# Patient Record
Sex: MALE | Race: BLACK OR AFRICAN AMERICAN | NOT HISPANIC OR LATINO | Employment: UNEMPLOYED | URBAN - METROPOLITAN AREA
[De-identification: names, ages, dates, MRNs, and addresses within clinical notes are randomized per-mention and may not be internally consistent; named-entity substitution may affect disease eponyms.]

---

## 2023-08-22 LAB
CALCIDIOL (25 OH VITAMIN D3) (NG/ML) IN SER/PLAS: 27 NG/ML
ESTIMATED AVERAGE GLUCOSE FOR HBA1C: 117 MG/DL
HEMOGLOBIN A1C/HEMOGLOBIN TOTAL IN BLOOD: 5.7 %
THYROTROPIN (MIU/L) IN SER/PLAS BY DETECTION LIMIT <= 0.05 MIU/L: 1.54 MIU/L (ref 0.44–3.98)

## 2023-09-27 PROBLEM — H10.13 ALLERGIC CONJUNCTIVITIS OF BOTH EYES: Status: ACTIVE | Noted: 2023-09-27

## 2023-09-27 PROBLEM — R11.0 NAUSEA IN ADULT: Status: ACTIVE | Noted: 2023-09-27

## 2023-09-27 PROBLEM — R03.0 WHITE COAT SYNDROME WITHOUT HYPERTENSION: Status: ACTIVE | Noted: 2023-09-27

## 2023-09-27 PROBLEM — R03.0 ELEVATED BLOOD PRESSURE READING: Status: ACTIVE | Noted: 2023-09-27

## 2023-09-27 PROBLEM — J45.50 SEVERE PERSISTENT ASTHMA, UNCOMPLICATED (MULTI): Status: ACTIVE | Noted: 2023-09-27

## 2023-09-27 PROBLEM — J45.40 MODERATE PERSISTENT ASTHMA WITHOUT COMPLICATION (HHS-HCC): Status: ACTIVE | Noted: 2023-09-27

## 2023-09-27 PROBLEM — K64.4 EXTERNAL HEMORRHOID: Status: ACTIVE | Noted: 2023-09-27

## 2023-09-27 PROBLEM — R10.9 ABDOMINAL PAIN: Status: ACTIVE | Noted: 2023-09-27

## 2023-09-27 PROBLEM — R10.30 LOWER ABDOMINAL PAIN: Status: ACTIVE | Noted: 2023-09-27

## 2023-09-27 PROBLEM — F32.A ANXIETY AND DEPRESSION: Status: ACTIVE | Noted: 2023-09-27

## 2023-09-27 PROBLEM — L70.9 ACNE: Status: ACTIVE | Noted: 2022-11-04

## 2023-09-27 PROBLEM — K59.00 CONSTIPATION: Status: ACTIVE | Noted: 2023-09-27

## 2023-09-27 PROBLEM — F39 UNSPECIFIED MOOD (AFFECTIVE) DISORDER (CMS-HCC): Status: ACTIVE | Noted: 2023-09-27

## 2023-09-27 PROBLEM — R76.8 ELEVATED IGE LEVEL: Status: ACTIVE | Noted: 2023-09-27

## 2023-09-27 PROBLEM — J45.909 ASTHMA (HHS-HCC): Status: ACTIVE | Noted: 2023-09-27

## 2023-09-27 PROBLEM — R09.89 CHRONIC THROAT CLEARING: Status: ACTIVE | Noted: 2023-09-27

## 2023-09-27 PROBLEM — E55.9 VITAMIN D DEFICIENCY: Status: ACTIVE | Noted: 2023-09-27

## 2023-09-27 PROBLEM — H02.849 EYELID EDEMA: Status: ACTIVE | Noted: 2023-09-27

## 2023-09-27 PROBLEM — N53.19 ABNORMAL EJACULATION: Status: ACTIVE | Noted: 2023-09-27

## 2023-09-27 PROBLEM — E66.9 OBESITY PEDS (BMI >=95 PERCENTILE): Status: ACTIVE | Noted: 2023-09-27

## 2023-09-27 PROBLEM — K21.9 GERD (GASTROESOPHAGEAL REFLUX DISEASE): Status: ACTIVE | Noted: 2023-09-27

## 2023-09-27 PROBLEM — F54 PSYCHOLOGICAL FACTOR AFFECTING PHYSICAL CONDITION: Status: ACTIVE | Noted: 2023-09-27

## 2023-09-27 PROBLEM — F41.9 ANXIETY AND DEPRESSION: Status: ACTIVE | Noted: 2023-09-27

## 2023-09-27 PROBLEM — L90.6 STRIAE: Status: ACTIVE | Noted: 2023-09-27

## 2023-09-27 PROBLEM — L30.9 ECZEMA: Status: ACTIVE | Noted: 2023-09-27

## 2023-09-27 PROBLEM — R73.03 PREDIABETES: Status: ACTIVE | Noted: 2023-09-27

## 2023-09-27 PROBLEM — R06.02 SHORTNESS OF BREATH ON EXERTION: Status: ACTIVE | Noted: 2023-09-27

## 2023-09-27 PROBLEM — J38.3 VOCAL CORD DYSFUNCTION: Status: ACTIVE | Noted: 2023-09-27

## 2023-09-27 PROBLEM — R07.9 CHEST PAIN: Status: ACTIVE | Noted: 2023-09-27

## 2023-09-27 PROBLEM — R68.89 DISTORTED BODY IMAGE: Status: ACTIVE | Noted: 2023-09-27

## 2023-09-27 PROBLEM — E66.3 OVERWEIGHT (BMI 25.0-29.9): Status: ACTIVE | Noted: 2023-09-27

## 2023-09-27 PROBLEM — K92.1 HEMATOCHEZIA: Status: ACTIVE | Noted: 2023-09-27

## 2023-09-27 PROBLEM — K29.70 GASTRITIS: Status: ACTIVE | Noted: 2023-09-27

## 2023-09-27 PROBLEM — Z51.6 DESENSITIZATION TO ALLERGY SHOT: Status: ACTIVE | Noted: 2023-09-27

## 2023-09-27 PROBLEM — R94.2 ABNORMAL FLOW VOLUME LOOP: Status: ACTIVE | Noted: 2023-09-27

## 2023-09-27 PROBLEM — J30.9 ALLERGIC RHINITIS: Status: ACTIVE | Noted: 2023-09-27

## 2023-09-27 RX ORDER — LANSOPRAZOLE 30 MG/1
30 CAPSULE, DELAYED RELEASE ORAL
COMMUNITY

## 2023-09-27 RX ORDER — PSYLLIUM HUSK 0.4 G
1 CAPSULE ORAL 2 TIMES DAILY
COMMUNITY
Start: 2022-08-25

## 2023-09-27 RX ORDER — POLYETHYLENE GLYCOL 3350 17 G/17G
17 POWDER, FOR SOLUTION ORAL
COMMUNITY
Start: 2018-10-08

## 2023-09-27 RX ORDER — OMEPRAZOLE 20 MG/1
2 CAPSULE, DELAYED RELEASE ORAL
COMMUNITY
Start: 2022-02-21

## 2023-09-27 RX ORDER — POLYETHYLENE GLYCOL 3350 17 G/17G
17 POWDER, FOR SOLUTION ORAL DAILY
COMMUNITY

## 2023-09-27 RX ORDER — CLINDAMYCIN PHOSPHATE 10 UG/ML
LOTION TOPICAL
COMMUNITY
Start: 2022-05-04

## 2023-09-27 RX ORDER — ALBUTEROL SULFATE 90 UG/1
2-4 AEROSOL, METERED RESPIRATORY (INHALATION) SEE ADMIN INSTRUCTIONS
COMMUNITY

## 2023-09-27 RX ORDER — PNV NO.95/FERROUS FUM/FOLIC AC 28MG-0.8MG
100 TABLET ORAL DAILY
COMMUNITY

## 2023-09-27 RX ORDER — ONDANSETRON 4 MG/1
1-2 TABLET, ORALLY DISINTEGRATING ORAL EVERY 8 HOURS PRN
COMMUNITY
Start: 2022-02-21

## 2023-09-27 RX ORDER — CETIRIZINE HYDROCHLORIDE 10 MG/1
1 TABLET ORAL DAILY
COMMUNITY
Start: 2014-07-22 | End: 2024-04-03 | Stop reason: SDUPTHER

## 2023-09-27 RX ORDER — FLUTICASONE PROPIONATE 50 MCG
2 SPRAY, SUSPENSION (ML) NASAL DAILY
COMMUNITY
Start: 2022-05-04 | End: 2024-04-03 | Stop reason: SDUPTHER

## 2023-09-27 RX ORDER — POLYETHYLENE GLYCOL 3350 17 G/17G
17 POWDER, FOR SOLUTION ORAL 2 TIMES DAILY PRN
COMMUNITY

## 2023-09-27 RX ORDER — ALBUTEROL SULFATE 90 UG/1
2 AEROSOL, METERED RESPIRATORY (INHALATION) EVERY 4 HOURS PRN
COMMUNITY
Start: 2019-01-17

## 2023-09-27 RX ORDER — BISACODYL 5 MG
5 TABLET, DELAYED RELEASE (ENTERIC COATED) ORAL SEE ADMIN INSTRUCTIONS
COMMUNITY
Start: 2018-10-08

## 2023-09-27 RX ORDER — OLOPATADINE HYDROCHLORIDE 2 MG/ML
1 SOLUTION/ DROPS OPHTHALMIC DAILY
COMMUNITY

## 2023-09-27 RX ORDER — OMEPRAZOLE 10 MG/1
2 CAPSULE, DELAYED RELEASE ORAL DAILY
COMMUNITY

## 2023-09-27 RX ORDER — AZELASTINE 1 MG/ML
2 SPRAY, METERED NASAL 2 TIMES DAILY
COMMUNITY
Start: 2018-09-04

## 2023-09-27 RX ORDER — IPRATROPIUM BROMIDE 42 UG/1
2 SPRAY, METERED NASAL 2 TIMES DAILY
COMMUNITY
Start: 2022-05-04 | End: 2023-10-16 | Stop reason: SDUPTHER

## 2023-09-27 RX ORDER — AZELASTINE HYDROCHLORIDE 0.5 MG/ML
1 SOLUTION/ DROPS OPHTHALMIC 2 TIMES DAILY PRN
COMMUNITY
Start: 2017-10-17 | End: 2024-04-03 | Stop reason: ALTCHOICE

## 2023-09-27 RX ORDER — POLYETHYLENE GLYCOL 3350, SODIUM SULFATE ANHYDROUS, SODIUM BICARBONATE, SODIUM CHLORIDE, POTASSIUM CHLORIDE 236; 22.74; 6.74; 5.86; 2.97 G/4L; G/4L; G/4L; G/4L; G/4L
POWDER, FOR SOLUTION ORAL
COMMUNITY
Start: 2022-08-25

## 2023-09-27 RX ORDER — ALBUTEROL SULFATE 0.83 MG/ML
2.5 SOLUTION RESPIRATORY (INHALATION)
COMMUNITY
Start: 2018-10-02

## 2023-09-27 RX ORDER — MOMETASONE FUROATE AND FORMOTEROL FUMARATE DIHYDRATE 200; 5 UG/1; UG/1
2 AEROSOL RESPIRATORY (INHALATION)
COMMUNITY
Start: 2015-03-18

## 2023-09-27 RX ORDER — ALBUTEROL SULFATE 90 UG/1
1-2 AEROSOL, METERED RESPIRATORY (INHALATION) SEE ADMIN INSTRUCTIONS
COMMUNITY

## 2023-10-03 ENCOUNTER — EVALUATION (OUTPATIENT)
Dept: SPEECH THERAPY | Facility: CLINIC | Age: 23
End: 2023-10-03
Payer: COMMERCIAL

## 2023-10-03 ENCOUNTER — OFFICE VISIT (OUTPATIENT)
Dept: OTOLARYNGOLOGY | Facility: CLINIC | Age: 23
End: 2023-10-03
Payer: COMMERCIAL

## 2023-10-03 ENCOUNTER — APPOINTMENT (OUTPATIENT)
Dept: SPEECH THERAPY | Facility: CLINIC | Age: 23
End: 2023-10-03
Payer: COMMERCIAL

## 2023-10-03 VITALS — WEIGHT: 231 LBS | HEIGHT: 71 IN | BODY MASS INDEX: 32.34 KG/M2 | TEMPERATURE: 97.4 F

## 2023-10-03 DIAGNOSIS — J38.3 VOCAL CORD DYSFUNCTION: Primary | ICD-10-CM

## 2023-10-03 DIAGNOSIS — K21.9 GASTROESOPHAGEAL REFLUX DISEASE, UNSPECIFIED WHETHER ESOPHAGITIS PRESENT: ICD-10-CM

## 2023-10-03 DIAGNOSIS — R09.89 CHRONIC THROAT CLEARING: ICD-10-CM

## 2023-10-03 PROCEDURE — 31575 DIAGNOSTIC LARYNGOSCOPY: CPT | Performed by: OTOLARYNGOLOGY

## 2023-10-03 PROCEDURE — 92524 BEHAVRAL QUALIT ANALYS VOICE: CPT | Mod: GN

## 2023-10-03 PROCEDURE — 1036F TOBACCO NON-USER: CPT | Performed by: OTOLARYNGOLOGY

## 2023-10-03 PROCEDURE — 99244 OFF/OP CNSLTJ NEW/EST MOD 40: CPT | Performed by: OTOLARYNGOLOGY

## 2023-10-03 ASSESSMENT — ENCOUNTER SYMPTOMS
LOSS OF SENSATION IN FEET: 0
DEPRESSION: 0
OCCASIONAL FEELINGS OF UNSTEADINESS: 0

## 2023-10-03 ASSESSMENT — PATIENT HEALTH QUESTIONNAIRE - PHQ9
SUM OF ALL RESPONSES TO PHQ9 QUESTIONS 1 AND 2: 0
2. FEELING DOWN, DEPRESSED OR HOPELESS: NOT AT ALL
1. LITTLE INTEREST OR PLEASURE IN DOING THINGS: NOT AT ALL

## 2023-10-03 NOTE — Clinical Note
October 3, 2023     Patient: Patricia Calhoun   YOB: 2000   Date of Visit: 10/3/2023       To Whom it May Concern:    Patricia Calhoun was seen in my clinic on 10/3/2023. He {Return to school/sport:94237}.    If you have any questions or concerns, please don't hesitate to call.         Sincerely,          Jo Núñez, SLP        CC: No Recipients

## 2023-10-03 NOTE — PROGRESS NOTES
Speech-Language Pathology    Voice/TEP Evaluation    Patient Name: Patricia Calhoun  MRN: [id number]  Today's Date: 10/3/2023     Time Calculation  Start Time: 1130  Stop Time: 1200  Time Calculation (min): 30 min      Current Problem:  Patient Active Problem List   Diagnosis    Acne    Allergic conjunctivitis of both eyes    Allergic rhinitis    Anxiety and depression    Unspecified mood (affective) disorder (CMS/HCC)    Asthma    Abdominal pain    Chest pain    Lower abdominal pain    Abnormal flow volume loop    Chronic throat clearing    Distorted body image    Eczema    Elevated blood pressure reading    Elevated IgE level    External hemorrhoid    Eyelid edema    Gastritis    GERD (gastroesophageal reflux disease)    Hematochezia    Moderate persistent asthma without complication    Severe persistent asthma, uncomplicated    Constipation    Nausea in adult    Overweight (BMI 25.0-29.9)    Prediabetes    Psychological factor affecting physical condition    Shortness of breath on exertion    Striae    Vitamin D deficiency    Vocal cord dysfunction    White coat syndrome without hypertension    Abnormal ejaculation    Desensitization to allergy shot    Obesity peds (BMI >=95 percentile)   IMPRESSIONS  Patient presents with vocal cord dysfunction. Exacerbating factors include chronic throat clearing, untreated reflux and anxiety. Patient appears to be an excellent candidate for therapy which will target vocal wellness and respiratory retrianing.    Voice quality based on the GRBAS scale: 0=absent; 1=mild; 2=moderate; 3=severe    ndGndrndanddndend:nd nd2nd Roughness: 1    Breathiness: 0    Asthenia: 0    Strain: 0    CONTRIBUTING FACTORS  Supraglottic compression/MTD  Inadequate breath support/VCD/ILS  Habitual behaviors that misuse/abuse the voice  Exposure to biological irritants (GERD/LPR)  Inadequate intake of fluids for hydration    Pain = 0    SUBJECTIVE  HISTORY/REASON FOR REFERRAL    ASSESSMENT  PERCEPTUAL VOICE  FEATURES    Intonation: WFL  Loudness: WFL  Nasal resonance: WFL    Additional vocal characteristics noted included:  Glottal mohamud  Throat clears/coughing    FLEXIBLE LARYNGOSCOPY  Glottic closure: complete  Vocal fold mobility: normal  Mucosal edge: smooth bilaterally  Vascularity: posterior bilateral vocal cord edema and erythema  Subglottis: widely patent  Interarytenoid edema: none  Deep breaths: mild splinting with deep breaths    ADDITIONAL OBSERVATIONS  Improved breathing with straw breathing and RTB    TREATMENT  Initiated instruction this date in cough/throat clear reduction techniques and respiratory retraining including traditional RTB and straw breathing techniques.     Clinician modeled all techniques and accurate patient follow through confirmed.  Handouts provided to facilitate optimal home carryover.    PLAN OF CARE  Frequency: 2-4 times a month  Duration: 1-2 months    LONG TERM GOALS  Improve overall vocal and respiratory health to foster increased participation levels at home, work and in the community environment.    SHORT TERM GOALS  Patient will increase vocal wellness and decrease phono trauma in adherence with clinician prescribed vocal hygiene and wellness program per patient report 80% of his/her day.  Patient will demonstrate independent use of voice/breathing techniques x 80% accuracy.    RECOMMENDATIONS FOR THERAPEUTIC INTERVENTIONS  Respiratory training  Vocal hygiene program      POTENTIAL FOR IMPROVEMENT  Good      FACTORS AFFECTING PROGNOSIS  None    DISCUSSED PLAN OF CARE WITH the patient.  DISCUSSED RISK/BENEFITS WITH the patient.  PATIENT/CAREGIVER AGREEABLE WITH PLAN.

## 2023-10-03 NOTE — PROGRESS NOTES
Subjective   Patient ID: Patricia Calhoun is a 23 y.o. male who presents for vocal cord dysfunction.  Patricia Calhoun is a 23 y.o. male referred to me today by Dr. Keegan Petty  for vocal cord dysfunction.      He reports that he has throat tightness. This is worse with stress and exercise. He has associated postnasal drip in cold temperature exposure. The use of an inhaler improves resolves his symptoms after 3-4 minutes. He has wheezing with these episodes. He has noticed that he is not able to talk with these episodes. He denies chest tightness with these episodes. This doesn't wake him up at night.     He has tried Omeprazole without improvement in his reflux. He has symptoms twice a week. He has not modified his diet and has not tried other ppis.      He denies hoarseness or changes to swallow.      PMH: Allergic rhinitis, GERD, asthma,  FHx: reviewed and non-contributory to current complaint.   I personally reviewed the patient intake forms, and these were scanned into the electronic medical record today, 10/03/2032             Review of Systems  ROS performed. All other systems are reviewed and are negative for complaint      Objective   Physical Exam  CONSTITUTIONAL: Vitals - per intake. Well developed, well nourished.    VOICE: Normal   ORAL CAVITY/OROPHARYNX/LIPS: Normal mucous membranes, normal floor of mouth/tongue/OP, no masses or lesions are noted.  Tonsils are 1+  RESPIRATION: Breathing comfortably, no stridor.    PSYCH: Alert and oriented with appropriate mood and affect.       PROCEDURE NOTE:  Recommended flexible laryngoscopy. Risks, benefits,  and alternatives were explained. He wished to proceed and provides verbal consent.     PROCEDURE:  Flexible Laryngoscopy, CPT 68242    POSTPROCEDURE DIAGNOSIS: ? VCD    INDICATIONS: Inability to tolerate mirror exam or abnormal findings on mirror, Flexible Laryngoscopy/Stroboscopy performed to assess one of the followin. Diagnosis of symptomatic disorder  involving the voice, swallow, upper aerodigestive tract, including KENNEY disorders, or  2. Preoperative evaluation of vocal cord function for individuals undergoing surgery where the RLN or vagus nerves are at risk of injury, or  3. Further evaluation of abnormalities of the upper aerodigestive tract discovered by another modality, such as CT, MRI, bronchoscopy or EGD    Description of Procedure:    After adequate afrin and lidocaine spray, I advanced the endoscope.  Visualization of the nasopharynx, vallecula, posterior pharyngeal walls, pyriform, epiglottis and post cricoid areas was unremarkable.  The following laryngeal findings were noted:    vocal cord movement was normal   closure was complete   Edema and erythema of the posterior vocal cords   interarytenoid edema mild  lesions were normal   There was splinting of the vocal cords   the subglottis was widely patent  Pharyngeal wall squeeze was normal    Procedure well tolerated.     Assessment/Plan   This is an initial visit for ? VCD with clinical findings of splinting of the bilateral vocal cords consistent with VCD.    Secondary issues of bilateral posterior vocal cord edema and erythema likely due to throat clearing or cough.     Exacerbating factors include:  Asthma, uncontrolled GERD    Treatment options discussed including:     He will work with speech therapy on activity based breathing and respiratory retraining. At this point, he will hold off on strenuous exercises.   He is receiving therapy for anxiety. He will continue this.  He will work with his PCP on reflux management     The patient’s questions were answered.

## 2023-10-03 NOTE — PATIENT INSTRUCTIONS
You need speech therapy as part of your treatment. We will help you to schedule your speech appointment after your visit or you can call Speech Therapy Scheduling at [phone]. Please follow up pending the outcome of speech therapy.    Follow up with PCP on reflux management

## 2023-10-16 ENCOUNTER — OFFICE VISIT (OUTPATIENT)
Dept: ALLERGY | Facility: HOSPITAL | Age: 23
End: 2023-10-16
Payer: COMMERCIAL

## 2023-10-16 VITALS
SYSTOLIC BLOOD PRESSURE: 133 MMHG | HEIGHT: 72 IN | TEMPERATURE: 97.9 F | BODY MASS INDEX: 32.19 KG/M2 | DIASTOLIC BLOOD PRESSURE: 84 MMHG | HEART RATE: 108 BPM | WEIGHT: 237.66 LBS | RESPIRATION RATE: 20 BRPM

## 2023-10-16 DIAGNOSIS — J45.50 SEVERE PERSISTENT ASTHMA WITHOUT COMPLICATION (MULTI): Primary | ICD-10-CM

## 2023-10-16 DIAGNOSIS — J30.1 SEASONAL ALLERGIC RHINITIS DUE TO POLLEN: ICD-10-CM

## 2023-10-16 DIAGNOSIS — J31.0 MIXED RHINITIS: ICD-10-CM

## 2023-10-16 PROCEDURE — 1036F TOBACCO NON-USER: CPT | Performed by: ALLERGY & IMMUNOLOGY

## 2023-10-16 PROCEDURE — 3075F SYST BP GE 130 - 139MM HG: CPT | Performed by: ALLERGY & IMMUNOLOGY

## 2023-10-16 PROCEDURE — 3079F DIAST BP 80-89 MM HG: CPT | Performed by: ALLERGY & IMMUNOLOGY

## 2023-10-16 PROCEDURE — 99214 OFFICE O/P EST MOD 30 MIN: CPT | Performed by: ALLERGY & IMMUNOLOGY

## 2023-10-16 RX ORDER — IPRATROPIUM BROMIDE 42 UG/1
2 SPRAY, METERED NASAL 3 TIMES DAILY
Qty: 36 ML | Refills: 11 | Status: SHIPPED | OUTPATIENT
Start: 2023-10-16 | End: 2024-10-15

## 2023-10-16 RX ORDER — MONTELUKAST SODIUM 10 MG/1
10 TABLET ORAL DAILY
Qty: 30 TABLET | Refills: 5 | Status: SHIPPED | OUTPATIENT
Start: 2023-10-16 | End: 2024-04-03 | Stop reason: SDUPTHER

## 2023-10-16 NOTE — PROGRESS NOTES
Patricia Calhoun presents for follow up evaluation today.      He provides the following history:    Last plan  continue zyrtec  Continue flonase  restart ipratropium nasal spray 2 sprays each nostril 2 x daily as needed    INCREASE the Dulera to 2 puffs 2 x daily of the 200mcg  Dulera is also your rescue medication: 50mcg 2 puffs evrry 4-6 hour as needed  to maximum of 12 puffs per day of dulera  YOU DO NOT NEED ALBUTEROL OR THE NEBULIZER WITH THIS PLAN    not restarting Spiriva at this time, will consider restarting if above plan doesn’t work    follow up with GI about your gastritis    Schedule breathing test and FENO at main campus    referral to Speech therapy for VCD  Vocal cord dysfunction is caused by paradoxical vocal cord movement; meaning it causes the abnormal closure of the vocal cords with inhalation, so air has difficulty passing through the small opening and it causes shortness of breath.  I am suggesting Vocal Cord Exercises and  I am referring you to Speech therapy for more evaluation.      Interval:  ENT eval 2 weeks ago, confirmed VCD and recommended speech therapy, has done one session same day of visit.  FEV1 78%  FENO 48    Dulera rescue 1 x per week    rhinitis: takes flonase and zyrtec, and has ongoing congestion   asthma: no OCS or major flares since last visit      ROS:  Pertinent positives and negatives have been assessed in the HPI.  All others systems have been reviewed and are negative for complaint.      Vital signs:  Vitals:    10/16/23 1338   BP: 133/84   Pulse: 108   Resp:    Temp:          Physical Exam:  GENERAL: Alert, oriented and in no acute distress.     HEENT: EYES: No conjunctival injection or cobblestoning. Nose: nasal turbinates mildly edematous and are not boggy.  There is no mucous stranding, polyps, or blood    noted. EARS: Tympanic membranes are clear. MOUTH: moist and pink with no exudates, ulcers, or thrush. NECK: is supple, without adenopathy.  No upper airway stridor  noted.       HEART: regular rate and rhythm.       LUNGS: Clear to auscultation bilaterally. No wheezing, rhonchi or rales.        ABDOMEN: Positive bowel sounds, soft, nontender, nondistended.       EXTREMITIES: No clubbing or edema.        NEURO:  Normal affect.  Gait normal.      SKIN: No rash, hives, or angioedema noted      Impression:  1. Severe persistent asthma without complication  Pulmonary function test      2. Mixed rhinitis  ipratropium (Atrovent) 42 mcg (0.06 %) nasal spray    montelukast (Singulair) 10 mg tablet      3. Seasonal allergic rhinitis due to pollen            Assessment and Plan:    Patricia is a patient  for follow up of allergic rhinitis and asthma and VCD, he has been relatively controlled despite the discontinuation of SCIT, SPT after SCIT complete positive to aspergillus.  most of his resp symptoms are more consistent with VCD  rhintis is primarily non-allergic: uncontrolled  on flonase/zyrtec  ipratropium restarting montelukast because previous discontinuation not convinced that depressed mood was related    In terms of asthma/vcd: continued ST, continue dulera 2p BID and dulera 50 SMART    Not a current issue: s/p referral to GI for throat clearing and felling of tightness consistent with RAPHAEL and s/p  omeprazole/miralax    Ongoing  bilateral eyelid angioedema: prevoiusly ordered UA to assess for proteinuria     previous Decision to stop SCIT s/p 3.5 years of completion with modest benefit ((asthma was better controlled ie: less exacerbations and less OCS, but same requirement of ICS/LABA dosing), but rhinitis and conjunctivitis was not improved on SCIT),      ----------------------------------      historically:  elevated BPs long standing: seen by nephrology, on no current treatment     severe persistent asthma. S/p with Dr. Spann:   S/p spiriva 1.25mcg/act since Nov 2018  S/p xolair in years past (remonte history a few injections)     AR/AC  started SCIT December 2016, Maintenance  achieved Sept 2017, but since then has been back and forth with rebuilid for being late and for new vials. so re-reached maintenance Jan 23rd 2018  continued maintenance therapy from Jan 2018 through Dec 2020--reached goal to do SCIT at least through Jan 2021 so 3 full years since re-reaching maintenance in Jan 2018  after his last injection Dec 2020, he was 3 months late, was having large locals with injections and was due for new vials  advised to stop SCIT and reassess with repeat skin testing  Prior test results: +dust mite, mold, ragweed,cat,tree, weed,cockroach

## 2023-10-16 NOTE — PATIENT INSTRUCTIONS
Recommendations:    Restart monteukast 10mg daily ( singulair)---stop if your sleep or mood is changed   Continue zyrtec and flonase  Use ipratropium nasal spray as needed 2 sprays each nostril up to 3 x daily for congestion and drip     Continue dulera 2p 2 x daily   Continued dulera 5o mcg dose 2 puffs every 4-6 hours as needed    Continue speech therapy for VCD ( this was confirmed with your scope)     Follow up 6 months with same day breathing test-- to call to coordinate  It was a pleasure to see you in clinic today  Call our Nurse Line with questions: [phone]    Call our  for visit follow up [phone]

## 2024-01-30 ENCOUNTER — APPOINTMENT (OUTPATIENT)
Dept: RADIOLOGY | Facility: HOSPITAL | Age: 24
End: 2024-01-30
Payer: COMMERCIAL

## 2024-01-30 ENCOUNTER — CLINICAL SUPPORT (OUTPATIENT)
Dept: EMERGENCY MEDICINE | Facility: HOSPITAL | Age: 24
End: 2024-01-30
Payer: COMMERCIAL

## 2024-01-30 ENCOUNTER — HOSPITAL ENCOUNTER (EMERGENCY)
Facility: HOSPITAL | Age: 24
Discharge: HOME | End: 2024-01-30
Payer: COMMERCIAL

## 2024-01-30 VITALS
DIASTOLIC BLOOD PRESSURE: 89 MMHG | HEIGHT: 71 IN | OXYGEN SATURATION: 100 % | BODY MASS INDEX: 32.2 KG/M2 | SYSTOLIC BLOOD PRESSURE: 167 MMHG | WEIGHT: 230 LBS | RESPIRATION RATE: 18 BRPM | HEART RATE: 94 BPM | TEMPERATURE: 98.1 F

## 2024-01-30 DIAGNOSIS — R07.9 CHEST PAIN, UNSPECIFIED TYPE: Primary | ICD-10-CM

## 2024-01-30 DIAGNOSIS — Z78.9 EXCESSIVE CAFFEINE INTAKE: ICD-10-CM

## 2024-01-30 PROCEDURE — 71046 X-RAY EXAM CHEST 2 VIEWS: CPT | Mod: FOREIGN READ | Performed by: RADIOLOGY

## 2024-01-30 PROCEDURE — 99284 EMERGENCY DEPT VISIT MOD MDM: CPT | Performed by: PHYSICIAN ASSISTANT

## 2024-01-30 PROCEDURE — 99283 EMERGENCY DEPT VISIT LOW MDM: CPT

## 2024-01-30 PROCEDURE — 71046 X-RAY EXAM CHEST 2 VIEWS: CPT

## 2024-01-30 PROCEDURE — 93005 ELECTROCARDIOGRAM TRACING: CPT

## 2024-01-30 ASSESSMENT — PAIN SCALES - GENERAL: PAINLEVEL_OUTOF10: 6

## 2024-01-30 ASSESSMENT — PAIN - FUNCTIONAL ASSESSMENT: PAIN_FUNCTIONAL_ASSESSMENT: 0-10

## 2024-01-30 ASSESSMENT — COLUMBIA-SUICIDE SEVERITY RATING SCALE - C-SSRS
2. HAVE YOU ACTUALLY HAD ANY THOUGHTS OF KILLING YOURSELF?: NO
6. HAVE YOU EVER DONE ANYTHING, STARTED TO DO ANYTHING, OR PREPARED TO DO ANYTHING TO END YOUR LIFE?: NO
1. IN THE PAST MONTH, HAVE YOU WISHED YOU WERE DEAD OR WISHED YOU COULD GO TO SLEEP AND NOT WAKE UP?: NO

## 2024-01-30 NOTE — ED TRIAGE NOTES
Pt presents with cp and SOB with cough that started on yesterday, denies cold symtpoms but reports increased intake of energy drinks

## 2024-01-31 LAB
ATRIAL RATE: 102 BPM
P AXIS: 61 DEGREES
P OFFSET: 195 MS
P ONSET: 147 MS
PR INTERVAL: 142 MS
Q ONSET: 218 MS
QRS COUNT: 16 BEATS
QRS DURATION: 76 MS
QT INTERVAL: 314 MS
QTC CALCULATION(BAZETT): 409 MS
QTC FREDERICIA: 374 MS
R AXIS: 66 DEGREES
T AXIS: -17 DEGREES
T OFFSET: 375 MS
VENTRICULAR RATE: 102 BPM

## 2024-01-31 NOTE — ED PROVIDER NOTES
HPI   Chief Complaint   Patient presents with    Chest Pain    Shortness of Breath       HPI:Patient is a 23-year-old male with history of asthma who presents to the ED for chest pain and shortness of breath that has been ongoing for the past 24 hours.  Patient states that he has been drinking energy drinks recently to work out.  States that yesterday he drank 2 and within a few hours he began feeling dizzy, lightheaded, nauseous, shortness of breath and felt as though he might pass out.  States that most of his symptoms resolved but last night had a hard time sleeping due to his heart racing.  States that he was having little bit of chest pain today so he decided to go to the ED for evaluation.  He does know an intermittent dry cough.  States that his symptoms are intermittent in nature.  Denies any current shortness of breath.  Denies any hemoptysis, lower extremity swelling, recent hospital stays, recent travel, history of blood clots, fevers, chills, nausea, vomiting, congestion.  Denies tobacco abuse.  ------------------------------------------------------------------------------------------------------------------------------------------  ROS: a ten point review of systems was performed and was negative except as per HPI.  ------------------------------------------------------------------------------------------------------------------------------------------  PMH / PSH: as per HPI, otherwise reviewed   MEDS: as per HPI, otherwise reviewed in EMR  ALLERGIES: as per HPI, otherwise reviewed in EMR  SocH:  as per HPI, otherwise reviewed in EMR  FH:  as per HPI, otherwise reviewed in EMR   ------------------------------------------------------------------------------------------------------------------------------------------  Physical Exam:  VS: As documented in the triage note and EMR flowsheet from this visit was reviewed  General: Well appearing. No acute distress.   Eyes:  Extraocular movements grossly  intact. No scleral icterus.   Head: Atraumatic. Normocephalic.     Neck: No meningismus. No gross masses. Full movement through range of motion  ENT: Posterior oropharynx shows no erythema, exudate or edema.  Uvula is midline without edema.  No stridor or trismus  CV: Regular rhythm. No murmurs, rubs, gallops appreciated.   Resp: Clear to auscultation bilaterally. No respiratory distress.    GI: Nontender. Soft. No masses. No rebound, rigidity or guarding.   MSK: Symmetric muscle bulk. No gross step offs or deformities.  Skin: Warm, dry. No rashes  Neuro: CN II-VII intact. A&O x3. Speech fluent. Alert. Moving all extremities. Ambulates with normal gait  Psych: Appropriate mood and affect for situation  ------------------------------------------------------------------------------------------------------------------------------------------  Hospital Course / Medical Decision Making: Patient is a 23-year-old male who presents to the ED for chest pain and shortness of breath in the setting of increased caffeine consumption that started yesterday.  On examination, patient is well-appearing.  Vitals initially showed tachycardia at 105, currently improved to low 90s.  States that his symptoms started after increasing consumption of energy drinks.  States that he had an episode of dizziness, nausea, shortness of breath and feeling presyncopal yesterday.  Developed chest pain and that is why he came to the ED.  Cardiopulmonary examination was shows no adventitious breath sounds.  EKG obtained and showed sinus tachycardia at 102 bpm, noted PACs, normal axis, no STEMI.  Differential of ACS considered however given patient's low risk factors and the fact that this all started after drinking 2 energy drinks have low concern for this.  Given patient's normalization of heart rate and no other risk factors for PE, low concern for this as well.  Chest x-ray obtained and shows no acute cardiopulmonary abnormality.  On reassessment,  patient feels improved.  Not having any current symptoms.  I advised the patient that his symptoms were likely due to excessive caffeine intake.  He was advised to decrease or abstain from the consumption of energy drinks. Patient has remained hemodynamically stable throughout the course of their ED stay.  Patient is home-going.  Patient advised to return to the ED for any worsening symptoms.  Advised to follow-up with PCP.  Patient was discharged in stable condition.                              [place] Coma Scale Score: 15                  Patient History   Past Medical History:   Diagnosis Date    Acute maxillary sinusitis, unspecified 11/14/2016    Acute maxillary sinusitis    Chondrocostal junction syndrome (tietze) 04/25/2017    Costochondritis    Other adverse food reactions, not elsewhere classified, initial encounter 07/22/2016    Adverse food reaction    Other conditions influencing health status 11/15/2016    History of cough    Otitis media, unspecified, left ear 02/14/2017    Left acute otitis media    Patient's noncompliance with other medical treatment and regimen due to unspecified reason 08/28/2015    Current non-adherence to medical treatment    Patient's unintentional underdosing of medication regimen for other reason 09/17/2016    Patient's unintentional underdosing of medication regimen for other reason    Personal history of other diseases of the respiratory system 11/13/2018    History of acute sinusitis    Severe persistent asthma, uncomplicated 10/17/2017    Asthma, severe persistent    Unspecified asthma with (acute) exacerbation 06/17/2015    Asthma exacerbation    Viral conjunctivitis, unspecified 12/12/2017    Acute viral conjunctivitis of right eye     Past Surgical History:   Procedure Laterality Date    OTHER SURGICAL HISTORY  09/14/2016    Thyroglossal Duct Cyst Excision     Family History   Problem Relation Name Age of Onset    Hypertension Mother      Diabetes Father      Eczema  Father      Hypertension Father      Heart attack Father      Eczema Sister      Urolithiasis Mother's Sister      Other (dialysis patient) Father's Sister      Hypertension Maternal Grandmother      Hypertension Paternal Grandmother      Heart attack Paternal Grandfather      Asthma Child      Gout Maternal Great-Grandfather      Nephrolithiasis Maternal Cousin      Lupus Maternal Cousin      Other (dialysis patient) Paternal Cousin       Social History     Tobacco Use    Smoking status: Never    Smokeless tobacco: Never   Substance Use Topics    Alcohol use: Not on file    Drug use: Not on file       Physical Exam   ED Triage Vitals   Temperature Heart Rate Respirations BP   01/30/24 1835 01/30/24 1835 01/30/24 1835 01/30/24 1835   36.7 °C (98.1 °F) (!) 105 17 (!) 148/98      Pulse Ox Temp src Heart Rate Source Patient Position   01/30/24 1835 -- 01/30/24 2012 --   99 %  Monitor       BP Location FiO2 (%)     -- --             Physical Exam    ED Course & MDM   Diagnoses as of 01/30/24 2117   Chest pain, unspecified type   Excessive caffeine intake       Medical Decision Making      Procedure  Procedures     Jo Ann Rodriguez PA-C  01/30/24 2120

## 2024-02-17 ENCOUNTER — HOSPITAL ENCOUNTER (EMERGENCY)
Facility: HOSPITAL | Age: 24
Discharge: HOME | End: 2024-02-18
Attending: EMERGENCY MEDICINE
Payer: COMMERCIAL

## 2024-02-17 DIAGNOSIS — F19.920 DRUG INTOXICATION WITHOUT COMPLICATION (MULTI): Primary | ICD-10-CM

## 2024-02-17 PROCEDURE — 99284 EMERGENCY DEPT VISIT MOD MDM: CPT | Performed by: EMERGENCY MEDICINE

## 2024-02-17 PROCEDURE — 93010 ELECTROCARDIOGRAM REPORT: CPT | Performed by: EMERGENCY MEDICINE

## 2024-02-17 PROCEDURE — 99283 EMERGENCY DEPT VISIT LOW MDM: CPT

## 2024-02-17 ASSESSMENT — PAIN DESCRIPTION - PROGRESSION: CLINICAL_PROGRESSION: NOT CHANGED

## 2024-02-17 ASSESSMENT — COLUMBIA-SUICIDE SEVERITY RATING SCALE - C-SSRS
6. HAVE YOU EVER DONE ANYTHING, STARTED TO DO ANYTHING, OR PREPARED TO DO ANYTHING TO END YOUR LIFE?: NO
1. IN THE PAST MONTH, HAVE YOU WISHED YOU WERE DEAD OR WISHED YOU COULD GO TO SLEEP AND NOT WAKE UP?: NO
2. HAVE YOU ACTUALLY HAD ANY THOUGHTS OF KILLING YOURSELF?: NO

## 2024-02-17 ASSESSMENT — LIFESTYLE VARIABLES
EVER HAD A DRINK FIRST THING IN THE MORNING TO STEADY YOUR NERVES TO GET RID OF A HANGOVER: NO
HAVE PEOPLE ANNOYED YOU BY CRITICIZING YOUR DRINKING: NO
EVER FELT BAD OR GUILTY ABOUT YOUR DRINKING: NO
HAVE YOU EVER FELT YOU SHOULD CUT DOWN ON YOUR DRINKING: NO

## 2024-02-17 ASSESSMENT — PAIN SCALES - GENERAL: PAINLEVEL_OUTOF10: 0 - NO PAIN

## 2024-02-17 ASSESSMENT — PAIN - FUNCTIONAL ASSESSMENT: PAIN_FUNCTIONAL_ASSESSMENT: 0-10

## 2024-02-18 ENCOUNTER — CLINICAL SUPPORT (OUTPATIENT)
Dept: EMERGENCY MEDICINE | Facility: HOSPITAL | Age: 24
End: 2024-02-18
Payer: COMMERCIAL

## 2024-02-18 VITALS
HEART RATE: 110 BPM | RESPIRATION RATE: 16 BRPM | BODY MASS INDEX: 32.2 KG/M2 | HEIGHT: 71 IN | WEIGHT: 230 LBS | OXYGEN SATURATION: 97 % | TEMPERATURE: 98.8 F | SYSTOLIC BLOOD PRESSURE: 136 MMHG | DIASTOLIC BLOOD PRESSURE: 80 MMHG

## 2024-02-18 LAB
ATRIAL RATE: 135 BPM
GLUCOSE BLD MANUAL STRIP-MCNC: 113 MG/DL (ref 74–99)
P AXIS: 45 DEGREES
P OFFSET: 201 MS
P ONSET: 147 MS
PR INTERVAL: 142 MS
Q ONSET: 218 MS
QRS COUNT: 22 BEATS
QRS DURATION: 80 MS
QT INTERVAL: 282 MS
QTC CALCULATION(BAZETT): 423 MS
QTC FREDERICIA: 369 MS
R AXIS: 89 DEGREES
T AXIS: -7 DEGREES
T OFFSET: 359 MS
VENTRICULAR RATE: 135 BPM

## 2024-02-18 PROCEDURE — 93005 ELECTROCARDIOGRAM TRACING: CPT

## 2024-02-18 PROCEDURE — 82947 ASSAY GLUCOSE BLOOD QUANT: CPT

## 2024-02-18 PROCEDURE — 2500000001 HC RX 250 WO HCPCS SELF ADMINISTERED DRUGS (ALT 637 FOR MEDICARE OP): Mod: SE | Performed by: STUDENT IN AN ORGANIZED HEALTH CARE EDUCATION/TRAINING PROGRAM

## 2024-02-18 RX ORDER — LORAZEPAM 0.5 MG/1
0.5 TABLET ORAL ONCE
Status: COMPLETED | OUTPATIENT
Start: 2024-02-18 | End: 2024-02-18

## 2024-02-18 RX ORDER — LORAZEPAM 0.5 MG/1
0.5 TABLET ORAL ONCE
Status: DISCONTINUED | OUTPATIENT
Start: 2024-02-18 | End: 2024-02-18

## 2024-02-18 RX ADMIN — LORAZEPAM 0.5 MG: 0.5 TABLET ORAL at 03:20

## 2024-02-18 NOTE — ED PROVIDER NOTES
HPI   Chief Complaint   Patient presents with    Acute Intoxication       This is a healthy 23-year-old male presenting to the ED for intoxication of unknown substance, reports that he was at a party, had an unknown notable substance, which caused him to feel anxious.  He denies any pain or shortness of breath reports he actually feels well in a euphoric, states he has a concert going on his head.                          [place] Coma Scale Score: 15                     Patient History   Past Medical History:   Diagnosis Date    Acute maxillary sinusitis, unspecified 11/14/2016    Acute maxillary sinusitis    Chondrocostal junction syndrome (tietze) 04/25/2017    Costochondritis    Other adverse food reactions, not elsewhere classified, initial encounter 07/22/2016    Adverse food reaction    Other conditions influencing health status 11/15/2016    History of cough    Otitis media, unspecified, left ear 02/14/2017    Left acute otitis media    Patient's noncompliance with other medical treatment and regimen due to unspecified reason 08/28/2015    Current non-adherence to medical treatment    Patient's unintentional underdosing of medication regimen for other reason 09/17/2016    Patient's unintentional underdosing of medication regimen for other reason    Personal history of other diseases of the respiratory system 11/13/2018    History of acute sinusitis    Severe persistent asthma, uncomplicated 10/17/2017    Asthma, severe persistent    Unspecified asthma with (acute) exacerbation 06/17/2015    Asthma exacerbation    Viral conjunctivitis, unspecified 12/12/2017    Acute viral conjunctivitis of right eye     Past Surgical History:   Procedure Laterality Date    OTHER SURGICAL HISTORY  09/14/2016    Thyroglossal Duct Cyst Excision     Family History   Problem Relation Name Age of Onset    Hypertension Mother      Diabetes Father      Eczema Father      Hypertension Father      Heart attack Father      Eczema Sister       Urolithiasis Mother's Sister      Other (dialysis patient) Father's Sister      Hypertension Maternal Grandmother      Hypertension Paternal Grandmother      Heart attack Paternal Grandfather      Asthma Child      Gout Maternal Great-Grandfather      Nephrolithiasis Maternal Cousin      Lupus Maternal Cousin      Other (dialysis patient) Paternal Cousin       Social History     Tobacco Use    Smoking status: Never    Smokeless tobacco: Never   Substance Use Topics    Alcohol use: Not on file    Drug use: Not on file       Physical Exam   ED Triage Vitals [02/17/24 2355]   Temp Heart Rate Respirations BP   -- (!) 133 18 (!) 152/105      Pulse Ox Temp src Heart Rate Source Patient Position   98 % -- -- Sitting      BP Location FiO2 (%)     Left arm --       Physical Exam  Constitutional:       Appearance: Normal appearance.   HENT:      Head: Normocephalic and atraumatic.      Mouth/Throat:      Mouth: Mucous membranes are moist.   Eyes:      Extraocular Movements: Extraocular movements intact.   Cardiovascular:      Rate and Rhythm: Normal rate and regular rhythm.      Heart sounds: Normal heart sounds. No murmur heard.  Pulmonary:      Effort: Pulmonary effort is normal. No respiratory distress.      Breath sounds: Normal breath sounds. No wheezing.   Abdominal:      General: There is no distension.      Palpations: Abdomen is soft.      Tenderness: There is no abdominal tenderness. There is no guarding.   Musculoskeletal:      Right lower leg: No edema.      Left lower leg: No edema.   Skin:     General: Skin is warm and dry.   Neurological:      General: No focal deficit present.      Mental Status: He is alert and oriented to person, place, and time.   Psychiatric:         Mood and Affect: Mood normal.         Behavior: Behavior normal.         ED Course & MDM   Diagnoses as of 02/18/24 0555   Drug intoxication without complication (CMS/[facility])       Medical Decision Making  The patient is tachycardic on  arrival to the ED but normotensive, and reports concern for intoxication.  At this time, he is not in any significant distress, he is behaving appropriately.  Plan to reassess after given some time to metabolize ingestion.  On repeat assessment, he appears mildly anxious, continues to have tachycardia.  Will dose with Ativan p.o. for supportive care    On repeat assessment his tachycardia is resolved he is resting comfortably, no longer anxious, he is approved for discharge with outpatient PCP follow-up, given instructions to not use any more illicit drugs    Discussed with the attending  Ervin Foley DO PGY-4  Emergency Medicine        Procedure  Procedures     Ervin Foley DO  Resident  02/18/24 8033

## 2024-02-18 NOTE — ED TRIAGE NOTES
Patient states that he was cleaning up after a party with some friends and he had some THC edibles of an unknown strength on accident, thinking it was normal candy; patient states he has only ever tried marijuana once before and is very anxious and would just like someone to keep an eye on him; denies any other illicit substances this evening

## 2024-02-18 NOTE — DISCHARGE INSTRUCTIONS
Please follow-up with your primary care physician.  Abstain from any illicit drugs.  Return to the emergency department if you develop any new, concerning, worsening symptoms.

## 2024-04-03 ENCOUNTER — HOSPITAL ENCOUNTER (OUTPATIENT)
Dept: RESPIRATORY THERAPY | Facility: HOSPITAL | Age: 24
Discharge: HOME | End: 2024-04-03
Payer: COMMERCIAL

## 2024-04-03 ENCOUNTER — OFFICE VISIT (OUTPATIENT)
Dept: ALLERGY | Facility: HOSPITAL | Age: 24
End: 2024-04-03
Payer: COMMERCIAL

## 2024-04-03 VITALS — HEART RATE: 109 BPM | DIASTOLIC BLOOD PRESSURE: 92 MMHG | SYSTOLIC BLOOD PRESSURE: 137 MMHG | TEMPERATURE: 97.8 F

## 2024-04-03 DIAGNOSIS — J45.50 SEVERE PERSISTENT ASTHMA WITHOUT COMPLICATION (MULTI): ICD-10-CM

## 2024-04-03 DIAGNOSIS — J31.0 MIXED RHINITIS: ICD-10-CM

## 2024-04-03 LAB
FEF 25-75: 3.21 L/S
FEV1/FVC: 74 %
FEV1: 4.31 LITERS
FVC: 5.83 LITERS

## 2024-04-03 PROCEDURE — 3075F SYST BP GE 130 - 139MM HG: CPT | Performed by: ALLERGY & IMMUNOLOGY

## 2024-04-03 PROCEDURE — 99215 OFFICE O/P EST HI 40 MIN: CPT | Performed by: ALLERGY & IMMUNOLOGY

## 2024-04-03 PROCEDURE — 94010 BREATHING CAPACITY TEST: CPT

## 2024-04-03 PROCEDURE — 3080F DIAST BP >= 90 MM HG: CPT | Performed by: ALLERGY & IMMUNOLOGY

## 2024-04-03 PROCEDURE — 94010 BREATHING CAPACITY TEST: CPT | Performed by: PEDIATRICS

## 2024-04-03 RX ORDER — MONTELUKAST SODIUM 10 MG/1
10 TABLET ORAL DAILY
Qty: 30 TABLET | Refills: 5 | Status: SHIPPED | OUTPATIENT
Start: 2024-04-03 | End: 2024-09-30

## 2024-04-03 RX ORDER — AZELASTINE HYDROCHLORIDE 0.5 MG/ML
1 SOLUTION/ DROPS OPHTHALMIC 2 TIMES DAILY PRN
Qty: 6 ML | Refills: 3 | Status: SHIPPED | OUTPATIENT
Start: 2024-04-03 | End: 2025-04-03

## 2024-04-03 RX ORDER — FLUTICASONE PROPIONATE 50 MCG
2 SPRAY, SUSPENSION (ML) NASAL DAILY
Qty: 16 G | Refills: 11 | Status: SHIPPED | OUTPATIENT
Start: 2024-04-03 | End: 2025-04-03

## 2024-04-03 RX ORDER — CETIRIZINE HYDROCHLORIDE 10 MG/1
10 TABLET ORAL DAILY
Qty: 90 TABLET | Refills: 3 | Status: SHIPPED | OUTPATIENT
Start: 2024-04-03

## 2024-04-03 NOTE — PATIENT INSTRUCTIONS
Recommendations:     Montelukast 10mg daily (singulair)---stop if your sleep or mood is changed   Continue zyrtec and flonase daily  Use ipratropium nasal spray as needed 2 sprays each nostril up to 3 x daily for congestion and drip      Continue dulera 2p 2 x daily   Continued dulera 5o mcg dose 2 puffs every 4-6 hours as needed     Continue speech therapy for VCD ( this was confirmed with your scope)      Follow up 6 months   It was a pleasure to see you in clinic today  Call our Nurse Line with questions: [phone]     Call our  for visit follow up [phone]

## 2024-04-03 NOTE — PROGRESS NOTES
Patricia Calhoun presents for follow up evaluation today.          Patient provides the following history:    His asthma has been great well controlled   He has not needed OCS  Using 50mcg dulera as exercise pretreatment     He has been better with  with GERD but still eats trigger foods  VCD hasn't been much of an issue    He was sick for 2 weeks with mucous and congestion in early march and used nasal sprays then    Montelukast is tolerated no mood change    He had an edible with marijuana and he had anxiousness and went to the ER  Also to ER for chest pain and after energy drinks  ROS:  Pertinent positives and negatives have been assessed in the HPI.  All others systems have been reviewed and are negative for complaint.      Vital signs:  BP (!) 137/92 (BP Location: Right arm, Patient Position: Sitting)   Pulse 109   Temp 36.6 °C (97.8 °F) (Oral)     Physical Exam:  GENERAL: Alert, oriented and in no acute distress.     HEENT: EYES: No conjunctival injection or cobblestoning. Nose: nasal turbinates mildly edematous and are not boggy.  There is no mucous stranding, polyps, or blood    noted. EARS: Tympanic membranes are clear. MOUTH: moist and pink with no exudates, ulcers, or thrush. NECK: is supple, without adenopathy.  No upper airway stridor noted.       HEART: regular rate and rhythm.       LUNGS: Clear to auscultation bilaterally. No wheezing, rhonchi or rales.        ABDOMEN: Positive bowel sounds, soft, nontender, nondistended.       EXTREMITIES: No clubbing or edema.        NEURO:  Normal affect.  Gait normal.      SKIN: No rash, hives, or angioedema noted      Impression:    1. Mixed rhinitis  montelukast (Singulair) 10 mg tablet    fluticasone (Flonase) 50 mcg/actuation nasal spray    azelastine (Optivar) 0.05 % ophthalmic solution    cetirizine (ZyrTEC) 10 mg tablet            Assessment and Plan:  Patricia is a patient  for follow up of allergic/mixed rhinitis and asthma and VCD, he has been relatively  controlled despite the discontinuation of SCIT, SPT after SCIT complete positive to aspergillus.  most of his resp symptoms are more consistent with VCD  rhintis is primarily non-allergic: moderate controlled  on flonase/zyrtec  ipratropium restarting montelukast because previous discontinuation not convinced that depressed mood was related     In terms of asthma/vcd: continued ST, continue dulera 2p BID and dulera 50 SMART     Not a current issue: s/p referral to GI for throat clearing and felling of tightness consistent with RAPHAEL and s/p  omeprazole/miralax     Ongoing  bilateral eyelid angioedema: prevoiusly ordered UA to assess for proteinuria     previous Decision to stop SCIT s/p 3.5 years of completion with modest benefit ((asthma was better controlled ie: less exacerbations and less OCS, but same requirement of ICS/LABA dosing), but rhinitis and conjunctivitis was not improved on SCIT),      ----------------------------------      historically:  elevated BPs long standing: seen by nephrology, on no current treatment     severe persistent asthma. S/p with Dr. Spann:   S/p spiriva 1.25mcg/act since Nov 2018  S/p xolair in years past (remonte history a few injections)     AR/AC  started SCIT December 2016, Maintenance achieved Sept 2017, but since then has been back and forth with rebuilid for being late and for new vials. so re-reached maintenance Jan 23rd 2018  continued maintenance therapy from Jan 2018 through Dec 2020--reached goal to do SCIT at least through Jan 2021 so 3 full years since re-reaching maintenance in Jan 2018  after his last injection Dec 2020, he was 3 months late, was having large locals with injections and was due for new vials  advised to stop SCIT and reassess with repeat skin testing  Prior test results: +dust mite, mold, ragweed,cat,tree, weed,cockroach

## 2024-04-05 ENCOUNTER — APPOINTMENT (OUTPATIENT)
Dept: RESPIRATORY THERAPY | Facility: HOSPITAL | Age: 24
End: 2024-04-05
Payer: COMMERCIAL

## 2024-04-15 ENCOUNTER — APPOINTMENT (OUTPATIENT)
Dept: ALLERGY | Facility: HOSPITAL | Age: 24
End: 2024-04-15
Payer: COMMERCIAL

## 2024-09-04 ENCOUNTER — OFFICE VISIT (OUTPATIENT)
Dept: ALLERGY | Facility: HOSPITAL | Age: 24
End: 2024-09-04
Payer: COMMERCIAL

## 2024-09-04 VITALS
DIASTOLIC BLOOD PRESSURE: 81 MMHG | WEIGHT: 235.45 LBS | TEMPERATURE: 98.1 F | HEART RATE: 108 BPM | BODY MASS INDEX: 32.84 KG/M2 | SYSTOLIC BLOOD PRESSURE: 122 MMHG

## 2024-09-04 DIAGNOSIS — H10.10 ALLERGIC RHINOCONJUNCTIVITIS: ICD-10-CM

## 2024-09-04 DIAGNOSIS — J45.50 SEVERE PERSISTENT ASTHMA WITHOUT COMPLICATION (MULTI): Primary | ICD-10-CM

## 2024-09-04 DIAGNOSIS — J30.9 ALLERGIC RHINOCONJUNCTIVITIS: ICD-10-CM

## 2024-09-04 DIAGNOSIS — H10.13 ALLERGIC CONJUNCTIVITIS OF BOTH EYES: ICD-10-CM

## 2024-09-04 PROCEDURE — 3074F SYST BP LT 130 MM HG: CPT | Performed by: ALLERGY & IMMUNOLOGY

## 2024-09-04 PROCEDURE — 99214 OFFICE O/P EST MOD 30 MIN: CPT | Performed by: ALLERGY & IMMUNOLOGY

## 2024-09-04 PROCEDURE — 3079F DIAST BP 80-89 MM HG: CPT | Performed by: ALLERGY & IMMUNOLOGY

## 2024-09-04 RX ORDER — CETIRIZINE HYDROCHLORIDE 10 MG/1
10 TABLET ORAL DAILY
Qty: 90 TABLET | Refills: 3 | Status: SHIPPED | OUTPATIENT
Start: 2024-09-04 | End: 2025-08-30

## 2024-09-04 RX ORDER — FLUTICASONE PROPIONATE 50 MCG
2 SPRAY, SUSPENSION (ML) NASAL DAILY
Qty: 48 G | Refills: 3 | Status: SHIPPED | OUTPATIENT
Start: 2024-09-04 | End: 2025-09-04

## 2024-09-04 RX ORDER — AZELASTINE 1 MG/ML
2 SPRAY, METERED NASAL 2 TIMES DAILY
Qty: 30 ML | Refills: 5 | Status: SHIPPED | OUTPATIENT
Start: 2024-09-04 | End: 2025-09-04

## 2024-09-04 RX ORDER — AZELASTINE HYDROCHLORIDE 0.5 MG/ML
1 SOLUTION/ DROPS OPHTHALMIC 2 TIMES DAILY
Qty: 6 ML | Refills: 1 | Status: SHIPPED | OUTPATIENT
Start: 2024-09-04 | End: 2024-12-03

## 2024-09-04 NOTE — PATIENT INSTRUCTIONS
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    Use zyrtec, flonase, nasal azelastine sprays as needed      Follow up 6 months  It was a pleasure to see you in clinic today  Call our Nurse Line with questions: [phone]    Call our [place] for visit follow up [phone]

## 2024-09-04 NOTE — PROGRESS NOTES
Patricia Calhoun presents for follow up evaluation today.          He provides the following history:    Last visit April of 2024  Plan at that time was:  Montelukast 10mg daily (singulair)---stop if your sleep or mood is changed   Continue zyrtec and flonase daily  Use ipratropium nasal spray as needed 2 sprays each nostril up to 3 x daily for congestion and drip      Continue dulera 2p (200)  2 x daily   Continued dulera 50 mcg dose 2 puffs every 4-6 hours as needed     Continue speech therapy for VCD ( this was confirmed with your scope)     Jeffery last visit FEV 91% with and FEV 25-75% in 60s    Interval:  He has burning sensation when outside, no redness, no hives, some dryness  Noticed when sweat  Rose butter on face 1-2 x daily--feels good doesn't burn  Vitamin C oil to face  He has been using 2 puffs one x daily     Not taking reflux medication anymore, no constipation  His reflux has been fine, denies symtpoms      ROS:  Pertinent positives and negatives have been assessed in the HPI.  All others systems have been reviewed and are negative for complaint.      Vital signs:  Vitals:    09/04/24 1345   BP: 122/81   Pulse: 108   Temp: 36.7 °C (98.1 °F)           Physical Exam:  GENERAL: Alert, oriented and in no acute distress.     HEENT: EYES: No conjunctival injection or cobblestoning. Nose: nasal turbinates mildly edematous and are not boggy.  There is no mucous stranding, polyps, or blood    noted. EARS: Tympanic membranes are clear. MOUTH: moist and pink with no exudates, ulcers, or thrush. NECK: is supple, without adenopathy.  No upper airway stridor noted.       HEART: regular rate and rhythm.       LUNGS: Clear to auscultation bilaterally. No wheezing, rhonchi or rales.        ABDOMEN: Positive bowel sounds, soft, nontender, nondistended.       EXTREMITIES: No clubbing or edema.        NEURO:  Normal affect.  Gait normal.      SKIN: No rash, hives, or angioedema noted      Impression:    1. Severe  persistent asthma without complication (Multi)  mometasone-formoterol (Dulera 200) 200-5 mcg/actuation inhaler      2. Allergic rhinoconjunctivitis  cetirizine (ZyrTEC) 10 mg tablet    fluticasone (Flonase) 50 mcg/actuation nasal spray    azelastine (Astelin) 137 mcg (0.1 %) nasal spray      3. Allergic conjunctivitis of both eyes  azelastine (Optivar) 0.05 % ophthalmic solution            Assessment and Plan:  Patricia is a patient for follow up of allergic/mixed rhinitis and asthma and VCD, he has been relatively controlled despite the discontinuation of SCIT, SPT after SCIT complete positive to aspergillus.  most of his resp symptoms are more consistent with VCD  Current issue is burning face without rash, unknown etiology, recommended moisture care.    rhintis is primarily non-allergic: moderate controlled on flonase/zyrtec  ipratropium s/p montelukast because previous discontinuation not convinced that depressed mood was related     In terms of asthma/vcd: continued ST, continue dulera 2p 1 x daily and has  device present at visit, recommended refill     Not a current issue: s/p referral to GI for throat clearing and felling of tightness consistent with RAPHAEL and s/p  omeprazole/miralax     Ongoing  bilateral eyelid angioedema: prevoiusly ordered UA to assess for proteinuria     previous Decision to stop SCIT s/p 3.5 years of completion with modest benefit ((asthma was better controlled ie: less exacerbations and less OCS, but same requirement of ICS/LABA dosing), but rhinitis and conjunctivitis was not improved on SCIT),      ----------------------------------      historically:  elevated BPs long standing: seen by nephrology, on no current treatment     severe persistent asthma. S/p with Dr. Spann:   S/p spiriva 1.25mcg/act since 2018  S/p xolair in years past (remonte history a few injections)     AR/AC  started SCIT 2016, Maintenance achieved 2017, but since then has been back and forth  with rebuilid for being late and for new vials. so re-reached maintenance Jan 23rd 2018  continued maintenance therapy from Jan 2018 through Dec 2020--reached goal to do SCIT at least through Jan 2021 so 3 full years since re-reaching maintenance in Jan 2018  after his last injection Dec 2020, he was 3 months late, was having large locals with injections and was due for new vials  advised to stop SCIT and reassess with repeat skin testing  Prior test results: +dust mite, mold, ragweed,cat,tree, weed,cockroach

## 2025-01-20 ENCOUNTER — HOSPITAL ENCOUNTER (EMERGENCY)
Facility: HOSPITAL | Age: 25
Discharge: HOME | End: 2025-01-20
Payer: COMMERCIAL

## 2025-01-20 ENCOUNTER — APPOINTMENT (OUTPATIENT)
Dept: RADIOLOGY | Facility: HOSPITAL | Age: 25
End: 2025-01-20
Payer: COMMERCIAL

## 2025-01-20 VITALS
TEMPERATURE: 98.6 F | HEART RATE: 100 BPM | HEIGHT: 71 IN | SYSTOLIC BLOOD PRESSURE: 142 MMHG | DIASTOLIC BLOOD PRESSURE: 89 MMHG | RESPIRATION RATE: 16 BRPM | OXYGEN SATURATION: 96 % | WEIGHT: 230 LBS | BODY MASS INDEX: 32.2 KG/M2

## 2025-01-20 DIAGNOSIS — S76.212A INGUINAL STRAIN, LEFT, INITIAL ENCOUNTER: Primary | ICD-10-CM

## 2025-01-20 LAB
APPEARANCE UR: CLEAR
BILIRUB UR STRIP.AUTO-MCNC: NEGATIVE MG/DL
COLOR UR: ABNORMAL
GLUCOSE UR STRIP.AUTO-MCNC: NORMAL MG/DL
KETONES UR STRIP.AUTO-MCNC: ABNORMAL MG/DL
LEUKOCYTE ESTERASE UR QL STRIP.AUTO: NEGATIVE
NITRITE UR QL STRIP.AUTO: NEGATIVE
PH UR STRIP.AUTO: 6 [PH]
PROT UR STRIP.AUTO-MCNC: NEGATIVE MG/DL
RBC # UR STRIP.AUTO: NEGATIVE /UL
SP GR UR STRIP.AUTO: 1.03
UROBILINOGEN UR STRIP.AUTO-MCNC: NORMAL MG/DL

## 2025-01-20 PROCEDURE — 76870 US EXAM SCROTUM: CPT | Performed by: RADIOLOGY

## 2025-01-20 PROCEDURE — 2500000001 HC RX 250 WO HCPCS SELF ADMINISTERED DRUGS (ALT 637 FOR MEDICARE OP): Mod: SE | Performed by: PHYSICIAN ASSISTANT

## 2025-01-20 PROCEDURE — 93975 VASCULAR STUDY: CPT

## 2025-01-20 PROCEDURE — 81003 URINALYSIS AUTO W/O SCOPE: CPT | Performed by: PHYSICIAN ASSISTANT

## 2025-01-20 PROCEDURE — 99284 EMERGENCY DEPT VISIT MOD MDM: CPT | Mod: 25

## 2025-01-20 PROCEDURE — 87661 TRICHOMONAS VAGINALIS AMPLIF: CPT | Performed by: PHYSICIAN ASSISTANT

## 2025-01-20 PROCEDURE — 87491 CHLMYD TRACH DNA AMP PROBE: CPT | Performed by: PHYSICIAN ASSISTANT

## 2025-01-20 RX ORDER — IBUPROFEN 600 MG/1
600 TABLET ORAL EVERY 6 HOURS PRN
Qty: 28 TABLET | Refills: 0 | Status: SHIPPED | OUTPATIENT
Start: 2025-01-20 | End: 2025-01-27

## 2025-01-20 RX ORDER — IBUPROFEN 600 MG/1
600 TABLET ORAL ONCE
Status: COMPLETED | OUTPATIENT
Start: 2025-01-20 | End: 2025-01-20

## 2025-01-20 RX ORDER — METHOCARBAMOL 500 MG/1
1000 TABLET, FILM COATED ORAL EVERY 8 HOURS PRN
Qty: 30 TABLET | Refills: 0 | Status: SHIPPED | OUTPATIENT
Start: 2025-01-20 | End: 2025-01-30

## 2025-01-20 RX ADMIN — IBUPROFEN 600 MG: 600 TABLET, FILM COATED ORAL at 16:29

## 2025-01-20 ASSESSMENT — PAIN SCALES - GENERAL
PAINLEVEL_OUTOF10: 6
PAINLEVEL_OUTOF10: 5 - MODERATE PAIN
PAINLEVEL_OUTOF10: 6

## 2025-01-20 ASSESSMENT — PAIN - FUNCTIONAL ASSESSMENT: PAIN_FUNCTIONAL_ASSESSMENT: 0-10

## 2025-01-20 NOTE — Clinical Note
Patricia Calhoun was seen and treated in our emergency department on 1/20/2025.  He may return to work on 01/22/2025.       If you have any questions or concerns, please don't hesitate to call.      Erika Call PA-C

## 2025-01-20 NOTE — ED TRIAGE NOTES
Patient groin pain on the L side after engaging in sexual activity - Patient rates pain 6/10. Denies swelling

## 2025-01-20 NOTE — DISCHARGE INSTRUCTIONS
Ultrasound of your scrotum is normal.  Urine does not appear infected, however if you test positive for gonorrhea and/or chlamydia, you will receive a follow up phone call.  Take medications and apply ice as needed for pain.   Please call [phone] for Primary Care referral for follow up appointments.

## 2025-01-20 NOTE — ED PROVIDER NOTES
Emergency Department Encounter  [facility] EMERGENCY MEDICINE    Patient: Patricia Calhoun  MRN: [id number]  : 2000  Date of Evaluation: 2025  ED Provider: Erika Call PA-C      Chief Complaint       Chief Complaint   Patient presents with    Groin Pain     HPI    Patricia Calhoun is a 24 y.o. male who presents to the emergency department presenting for left groin pain for the past 3 days.  Patient states that the pain is constant, however fluctuates in intensity.  Taking Tylenol at home with mild relief of pain.  Notes that his pain seems to be positional.  Most of the pain is located to the left testicle and radiates up to his left groin.  Denies any bulging, overlying redness, swelling or increased warmth.  Endorses being sexually active and states that this pain onset shortly after intercourse.  Endorses that he is having some pain with urination immediately after he urinates.  No associated fevers, chills, abdominal pain, nausea vomiting, diarrhea, hematuria, changes in urinary frequency or urgency, penile discharge. No changes in bowel habits, denies any PMH hernia.    ROS:     Review of Systems  14 systems reviewed and otherwise acutely negative except as in the HPI.    Past History     Past Medical History:   Diagnosis Date    Acute maxillary sinusitis, unspecified 2016    Acute maxillary sinusitis    Chondrocostal junction syndrome (tietze) 2017    Costochondritis    Other adverse food reactions, not elsewhere classified, initial encounter 2016    Adverse food reaction    Other conditions influencing health status 11/15/2016    History of cough    Otitis media, unspecified, left ear 2017    Left acute otitis media    Patient's noncompliance with other medical treatment and regimen due to unspecified reason 2015    Current non-adherence to medical treatment    Patient's unintentional underdosing of medication regimen for other reason 2016     Patient's unintentional underdosing of medication regimen for other reason    Personal history of other diseases of the respiratory system 11/13/2018    History of acute sinusitis    Severe persistent asthma, uncomplicated (Multi) 10/17/2017    Asthma, severe persistent    Unspecified asthma with (acute) exacerbation ([facility]) 06/17/2015    Asthma exacerbation    Viral conjunctivitis, unspecified 12/12/2017    Acute viral conjunctivitis of right eye     Past Surgical History:   Procedure Laterality Date    OTHER SURGICAL HISTORY  09/14/2016    Thyroglossal Duct Cyst Excision     Social History     Socioeconomic History    Marital status: Single   Tobacco Use    Smoking status: Never    Smokeless tobacco: Never       Medications/Allergies     Discharge Medication List as of 1/20/2025  6:47 PM        CONTINUE these medications which have NOT CHANGED    Details   !! albuterol (Proventil HFA) 90 mcg/actuation inhaler Inhale 2-4 puffs see administration instructions. Every 4-6 hours as needed for cough, wheeze or shortness of breath, Historical Med      !! albuterol (Ventolin HFA) 90 mcg/actuation inhaler Inhale 1-2 puffs see administration instructions. Take every 4-6 hours prn, Historical Med      albuterol 2.5 mg /3 mL (0.083 %) nebulizer solution Take 3 mL (2.5 mg) by nebulization. Every 4-6 hours prn wheezing, Starting Tue 10/2/2018, Historical Med      !! albuterol 90 mcg/actuation inhaler Inhale 2 puffs every 4 hours if needed for shortness of breath., Starting Thu 1/17/2019, Historical Med      !! azelastine (Astelin) 137 mcg (0.1 %) nasal spray Administer 2 sprays into each nostril 2 times a day., Starting Tue 9/4/2018, Historical Med      !! azelastine (Astelin) 137 mcg (0.1 %) nasal spray Administer 2 sprays into each nostril 2 times a day. Use in each nostril as directed, Starting Wed 9/4/2024, Until Thu 9/4/2025, Normal      azelastine (Optivar) 0.05 % ophthalmic solution Administer 1 drop into both eyes 2  times a day as needed (red watery eyes)., Starting Wed 4/3/2024, Until Thu 4/3/2025 at 2359, Normal      benzoyl peroxide 5 % lotion 1 Application, Historical Med      benzoyl peroxide cleanser (Benzac AC) 2.5 % cleanser topical wash 1 Application, Historical Med      bisacodyl (Dulcolax, bisacodyl,) 5 mg EC tablet Take 1 tablet (5 mg) by mouth see administration instructions. 1 tab(s) orally once to start and end bowel clean out; take 6x Miralax in between, Starting Mon 10/8/2018, Historical Med      !! cetirizine (ZyrTEC) 10 mg tablet Take 1 tablet (10 mg) by mouth once daily., Starting Wed 4/3/2024, Normal      !! cetirizine (ZyrTEC) 10 mg tablet Take 1 tablet (10 mg) by mouth once daily., Starting Wed 9/4/2024, Until Sat 8/30/2025, Normal      clindamycin (Cleocin T) 1 % lotion 1 Application, Historical Med      cyanocobalamin (Vitamin B-12) 100 mcg tablet Take 1 tablet (100 mcg) by mouth once daily., Historical Med      !! fluticasone (Flonase) 50 mcg/actuation nasal spray Administer 2 sprays into each nostril once daily., Starting Wed 4/3/2024, Until Thu 4/3/2025, Normal      !! fluticasone (Flonase) 50 mcg/actuation nasal spray Administer 2 sprays into each nostril once daily. Shake gently. Before first use, prime pump. After use, clean tip and replace cap., Starting Wed 9/4/2024, Until Thu 9/4/2025, Normal      ipratropium (Atrovent) 42 mcg (0.06 %) nasal spray Administer 2 sprays into each nostril 3 times a day., Starting Mon 10/16/2023, Until Tue 10/15/2024, Normal      lansoprazole (Prevacid) 30 mg DR capsule Take 1 capsule (30 mg) by mouth once daily in the morning. Take before meals. Do not crush or chew.   Take 1 pill 30 minutes before breakfast and dinner for 2 weeks, Then every other day thereafter, Historical Med      !! mometasone-formoterol (Dulera 200) 200-5 mcg/actuation inhaler 2 puffs 1 x daily with 2 puffs as needed SMART. Rinse mouth with water after use to reduce aftertaste and incidence of  candidiasis. Do not swallow., Normal      mometasone-formoterol (Dulera 50) 50-5 mcg/actuation HFA aerosol inhaler inhaler Inhale 2 puffs see administration instructions. Every 4-6 hours for smart therapy, Historical Med      !! mometasone-formoterol (Dulera) 200-5 mcg/actuation inhaler Inhale 2 puffs 2 times a day. Inhale into the lungs. Rinse mouth after use, Starting Wed 3/18/2015, Historical Med      montelukast (Singulair) 10 mg tablet Take 1 tablet (10 mg) by mouth once daily., Starting Wed 4/3/2024, Until Mon 9/30/2024, Normal      olopatadine (Pataday) 0.2 % ophthalmic solution 1 drop once daily. Place into affected eye, Historical Med      !! omeprazole (PriLOSEC) 10 mg DR capsule Take 2 capsules (20 mg) by mouth once daily., Historical Med      !! omeprazole (PriLOSEC) 20 mg DR capsule Take 2 capsules (40 mg) by mouth once daily in the morning. Take before meals. Take 30 minutes prior to breakfast, Starting Mon 2/21/2022, Historical Med      ondansetron ODT (Zofran-ODT) 4 mg disintegrating tablet Take 1-2 tablets (4-8 mg) by mouth every 8 hours if needed for nausea., Starting Mon 2/21/2022, Historical Med      !! polyethylene glycol (Glycolax, Miralax) 17 gram/dose powder Take 17 g by mouth 2 times a day as needed. Mix in 8 ounces of liquid and drink, Historical Med      !! polyethylene glycol (Miralax) 17 gram/dose powder Take 17 g by mouth. for six doses after taking first dose of Dulcolax, then second Dulcolax; once a day after even after regular stools, Starting Mon 10/8/2018, Historical Med      !! polyethylene glycol (Miralax) 17 gram/dose powder Take 17 g by mouth once daily. Mix in 8 ounces of liquid and drink once daily, Historical Med      polyethylene glycol-electrolytes (polyethylene glycol) 420 gram solution Take by mouth., Starting Thu 8/25/2022, Historical Med      psyllium (MetamuciL) 0.4 gram capsule Take 1 capsule by mouth 2 times a day., Starting Thu 8/25/2022, Historical Med       !! -  Potential duplicate medications found. Please discuss with provider.        Allergies   Allergen Reactions    Mold Wheezing        Physical Exam       ED Triage Vitals   Temperature Heart Rate Respirations BP   01/20/25 1511 01/20/25 1511 01/20/25 1511 01/20/25 1512   37 °C (98.6 °F) (!) 107 16 142/89      Pulse Ox Temp src Heart Rate Source Patient Position   01/20/25 1511 -- -- --   96 %         BP Location FiO2 (%)     -- --               Physical Exam    Physical Exam:    VS: As documented in the triage note and EMR flowsheet from this visit were reviewed.    Appearance: Alert, oriented, cooperative, in no acute distress. Well nourished & well hydrated.    Skin: Warm, intact and dry.    Neck: Supple, without lymphadenopathy.     Pulmonary: Clear bilaterally with good chest wall excursion. No rales, rhonchi or wheezing. No accessory muscle use or stridor.     Cardiac: Normal S1, S2.    Abdomen: Soft, nontender, active bowel sounds. No palpable organomegaly. No rebound or guarding. No CVA tenderness.    Genitourinary: Chaperone declined. External exam without lesions, vesicles. No discharge appreciated. Does have some focal erythema and edema to lateral aspect of left scrotum without focal fluctuance.    Musculoskeletal: Spontaneously moving all extremities without limitation. Extremities warm and well-perfused, capillary refill less than 2 seconds.     Diagnostics   Labs:  Labs Reviewed   URINALYSIS WITH REFLEX CULTURE AND MICROSCOPIC - Abnormal       Result Value    Color, Urine Light-Yellow      Appearance, Urine Clear      Specific Gravity, Urine 1.029      pH, Urine 6.0      Protein, Urine NEGATIVE      Glucose, Urine Normal      Blood, Urine NEGATIVE      Ketones, Urine TRACE (*)     Bilirubin, Urine NEGATIVE      Urobilinogen, Urine Normal      Nitrite, Urine NEGATIVE      Leukocyte Esterase, Urine NEGATIVE     TRICH VAGINALIS, AMPLIFIED   C. TRACHOMATIS + N. GONORRHOEAE, AMPLIFIED   URINALYSIS WITH REFLEX  "CULTURE AND MICROSCOPIC    Narrative:     The following orders were created for panel order Urinalysis with Reflex Culture and Microscopic.  Procedure                               Abnormality         Status                     ---------                               -----------         ------                     Urinalysis with Reflex C...[[id number]]  Abnormal            Final result               Extra Urine Gray Tube[[id number]]                            In process                   Please view results for these tests on the individual orders.   EXTRA URINE GRAY TUBE     Radiographs:  US scrotum w doppler           ED Course   Visit Vitals  /89   Pulse 100   Temp 37 °C (98.6 °F)   Resp 16   Ht 1.803 m (5' 11\")   Wt 104 kg (230 lb)   SpO2 96%   BMI 32.08 kg/m²   Smoking Status Never   BSA 2.28 m²     Medications   ibuprofen tablet 600 mg (600 mg oral Given 1/20/25 3966)       Medical Decision Making   Urine collected, pending US to assess for possible cellulitis v. Infection of scrotum.   UA noninfected.   US unremarkable. Will receive phone call in 1-2 business days regarding any positive urine cultures. Will treat as groin strain - Rest, Ice, and Elevate your injury as often as possible. Please call [phone] for Primary Care referral for follow up appointments.    Final Impression      1. Inguinal strain, left, initial encounter          DISPOSITION  Disposition: discharge  Patient condition is: Stable    Comment: Please note this report has been produced using speech recognition software and may contain errors related to that system including errors in grammar, punctuation, and spelling, as well as words and phrases that may be inappropriate.  If there are any questions or concerns please feel free to contact the dictating provider for clarification.    EASTON Oliver PA-C  01/20/25 3872    "

## 2025-01-21 LAB
C TRACH RRNA SPEC QL NAA+PROBE: NEGATIVE
HOLD SPECIMEN: NORMAL
N GONORRHOEA DNA SPEC QL PROBE+SIG AMP: NEGATIVE
T VAGINALIS RRNA SPEC QL NAA+PROBE: NEGATIVE

## 2025-01-24 ENCOUNTER — OFFICE VISIT (OUTPATIENT)
Facility: HOSPITAL | Age: 25
End: 2025-01-24
Payer: COMMERCIAL

## 2025-01-24 VITALS
HEART RATE: 114 BPM | OXYGEN SATURATION: 99 % | HEIGHT: 71 IN | SYSTOLIC BLOOD PRESSURE: 135 MMHG | DIASTOLIC BLOOD PRESSURE: 82 MMHG | BODY MASS INDEX: 32.2 KG/M2 | WEIGHT: 230 LBS | TEMPERATURE: 97.4 F

## 2025-01-24 DIAGNOSIS — S76.212A INGUINAL STRAIN, LEFT, INITIAL ENCOUNTER: ICD-10-CM

## 2025-01-24 ASSESSMENT — PATIENT HEALTH QUESTIONNAIRE - PHQ9
1. LITTLE INTEREST OR PLEASURE IN DOING THINGS: NOT AT ALL
SUM OF ALL RESPONSES TO PHQ9 QUESTIONS 1 AND 2: 0
2. FEELING DOWN, DEPRESSED OR HOPELESS: NOT AT ALL

## 2025-01-24 ASSESSMENT — PAIN SCALES - GENERAL: PAINLEVEL_OUTOF10: 0-NO PAIN

## 2025-01-24 NOTE — PROGRESS NOTES
Subjective   Patient ID: Patricia Calhoun is a 24 y.o. male who presents for Establish Care.    Hx Allergic rhinitis, GERD, asthma, anxiety, depression, and chronic constipation     #Left inguinal pain and scrotal discomfort  - left inguinal strain following masturbation on 1/20/25   - Reports improving soreness and aching in left inguinal region  - Associated symptoms: achiness with urination    - No fever, chills, discharge, hematuria or sexual activity  - Currently taking tylenol, robaxin (minimal relief), ibuprofen  - Physical exam notable for 0.3 x 0.2 x 0.3 cm epididymal head cyst    Review of Systems  As per HPI     Previous history  Past Medical History:   Diagnosis Date    Acute maxillary sinusitis, unspecified 11/14/2016    Acute maxillary sinusitis    Allergic     Chondrocostal junction syndrome (tietze) 04/25/2017    Costochondritis    Constipation     GERD (gastroesophageal reflux disease)     Other adverse food reactions, not elsewhere classified, initial encounter 07/22/2016    Adverse food reaction    Other conditions influencing health status 11/15/2016    History of cough    Otitis media, unspecified, left ear 02/14/2017    Left acute otitis media    Patient's noncompliance with other medical treatment and regimen due to unspecified reason 08/28/2015    Current non-adherence to medical treatment    Patient's unintentional underdosing of medication regimen for other reason 09/17/2016    Patient's unintentional underdosing of medication regimen for other reason    Personal history of other diseases of the respiratory system 11/13/2018    History of acute sinusitis    Severe persistent asthma, uncomplicated ([facility]) 10/17/2017    Asthma, severe persistent    Unspecified asthma with (acute) exacerbation ([facility]) 06/17/2015    Asthma exacerbation    Viral conjunctivitis, unspecified 12/12/2017    Acute viral conjunctivitis of right eye     Past Surgical History:   Procedure Laterality Date    OTHER  SURGICAL HISTORY  09/14/2016    Thyroglossal Duct Cyst Excision     Social History     Tobacco Use    Smoking status: Never    Smokeless tobacco: Never   Substance Use Topics    Alcohol use: Not Currently    Drug use: Never     Family History   Problem Relation Name Age of Onset    Hypertension Mother      Diabetes Father      Eczema Father      Hypertension Father      Heart attack Father      Eczema Sister      Urolithiasis Mother's Sister      Other (dialysis patient) Father's Sister      Hypertension Maternal Grandmother      Hypertension Paternal Grandmother      Heart attack Paternal Grandfather      Asthma Child      Gout Maternal Great-Grandfather      Nephrolithiasis Maternal Cousin      Lupus Maternal Cousin      Other (dialysis patient) Paternal Cousin       Allergies   Allergen Reactions    Mold Wheezing     Current Outpatient Medications   Medication Instructions    albuterol (Proventil HFA) 90 mcg/actuation inhaler 2-4 puffs, inhalation, See admin instructions, Every 4-6 hours as needed for cough, wheeze or shortness of breath     albuterol (Ventolin HFA) 90 mcg/actuation inhaler 1-2 puffs, inhalation, See admin instructions, Take every 4-6 hours prn     albuterol 90 mcg/actuation inhaler 2 puffs, inhalation, Every 4 hours PRN    albuterol 2.5 mg, nebulization, Every 4-6 hours prn wheezing     azelastine (Astelin) 137 mcg (0.1 %) nasal spray 2 sprays, Each Nostril, 2 times daily    azelastine (Astelin) 137 mcg (0.1 %) nasal spray 2 sprays, Each Nostril, 2 times daily, Use in each nostril as directed    azelastine (Optivar) 0.05 % ophthalmic solution 1 drop, Both Eyes, 2 times daily PRN    azelastine (Optivar) 0.05 % ophthalmic solution 1 drop, Both Eyes, 2 times daily    benzoyl peroxide 5 % lotion 1 Application    benzoyl peroxide cleanser (Benzac AC) 2.5 % cleanser topical wash 1 Application    bisacodyl (DULCOLAX (BISACODYL)) 5 mg, oral, See admin instructions, 1 tab(s) orally once to start and end  bowel clean out; take 6x Miralax in between    cetirizine (ZYRTEC) 10 mg, oral, Daily    cetirizine (ZYRTEC) 10 mg, oral, Daily    clindamycin (Cleocin T) 1 % lotion 1 Application    cyanocobalamin (VITAMIN B-12) 100 mcg, oral, Daily    fluticasone (Flonase) 50 mcg/actuation nasal spray 2 sprays, Each Nostril, Daily    fluticasone (Flonase) 50 mcg/actuation nasal spray 2 sprays, Each Nostril, Daily, Shake gently. Before first use, prime pump. After use, clean tip and replace cap.    ibuprofen 600 mg, oral, Every 6 hours PRN    ipratropium (Atrovent) 42 mcg (0.06 %) nasal spray 2 sprays, Each Nostril, 3 times daily    lansoprazole (PREVACID) 30 mg, oral, Daily before breakfast, Do not crush or chew.   Take 1 pill 30 minutes before breakfast and dinner for 2 weeks, Then every other day thereafter     methocarbamol (ROBAXIN) 1,000 mg, oral, Every 8 hours PRN    mometasone-formoterol (Dulera 200) 200-5 mcg/actuation inhaler 2 puffs 1 x daily with 2 puffs as needed SMART. Rinse mouth with water after use to reduce aftertaste and incidence of candidiasis. Do not swallow.    mometasone-formoterol (Dulera 50) 50-5 mcg/actuation HFA aerosol inhaler inhaler 2 puffs, inhalation, See admin instructions, Every 4-6 hours for smart therapy     mometasone-formoterol (Dulera) 200-5 mcg/actuation inhaler 2 puffs, inhalation, 2 times daily RT, Inhale into the lungs. Rinse mouth after use     montelukast (SINGULAIR) 10 mg, oral, Daily    olopatadine (Pataday) 0.2 % ophthalmic solution 1 drop, Daily, Place into affected eye     omeprazole (PriLOSEC) 10 mg DR capsule 2 capsules, oral, Daily    omeprazole (PriLOSEC) 20 mg DR capsule 2 capsules, oral, Daily before breakfast, Take 30 minutes prior to breakfast     ondansetron ODT (Zofran-ODT) 4 mg disintegrating tablet 1-2 tablets, oral, Every 8 hours PRN    polyethylene glycol (GLYCOLAX, MIRALAX) 17 g, oral, 2 times daily PRN, Mix in 8 ounces of liquid and drink     polyethylene glycol  (MIRALAX) 17 g, oral, for six doses after taking first dose of Dulcolax, then second Dulcolax; once a day after even after regular stools      polyethylene glycol (MIRALAX) 17 g, oral, Daily, Mix in 8 ounces of liquid and drink once daily     polyethylene glycol-electrolytes (polyethylene glycol) 420 gram solution oral    psyllium (MetamuciL) 0.4 gram capsule 1 capsule, oral, 2 times daily       Objective       Physical Exam  Cardiovascular:      Rate and Rhythm: Normal rate and regular rhythm.   Pulmonary:      Effort: Pulmonary effort is normal. No respiratory distress.      Breath sounds: No wheezing.   Neurological:      Mental Status: He is alert.   Psychiatric:         Mood and Affect: Mood normal.       Assessment/Plan   Patricia Calhoun is a 24 y.o. male with history of Allergic rhinitis, GERD, asthma, anxiety, depression, and chronic constipation  who presents for the concerns below:    #Left inguinal pain and scrotal discomfort  :: Mechanical strain of inguinal region, improving  :: Epididymal cyst unlikely source of current symptoms  :: No signs of infection or serious pathology  PLAN  - Continue conservative management with NSAIDs and muscle relaxer prn  - Monitor symptoms  - Return if worsening pain, fever, discharge or other concerning symptoms develop  - Consider urology referral if symptoms persist beyond 2-3 weeks    Problem List Items Addressed This Visit    None  Visit Diagnoses       Inguinal strain, left, initial encounter              Return in : 3 months    Discussed with co-signer of note Dr. Collins      Portions of this note were generated using digital voice recognition software, and may contain grammatical errors       Leonardo Ibanez, DO  Family Medicine PGY-3

## 2025-02-17 ENCOUNTER — TELEPHONE (OUTPATIENT)
Facility: HOSPITAL | Age: 25
End: 2025-02-17
Payer: COMMERCIAL

## 2025-02-17 NOTE — TELEPHONE ENCOUNTER
Patient called and stated that he is still in pain and nothing has changed, he is requesting a callback about this matter.

## 2025-02-24 DIAGNOSIS — S76.212A INGUINAL STRAIN, LEFT, INITIAL ENCOUNTER: Primary | ICD-10-CM

## 2025-03-05 ENCOUNTER — PHARMACY VISIT (OUTPATIENT)
Dept: PHARMACY | Facility: CLINIC | Age: 25
End: 2025-03-05
Payer: MEDICAID

## 2025-03-05 ENCOUNTER — OFFICE VISIT (OUTPATIENT)
Dept: ALLERGY | Facility: HOSPITAL | Age: 25
End: 2025-03-05
Payer: COMMERCIAL

## 2025-03-05 VITALS
DIASTOLIC BLOOD PRESSURE: 88 MMHG | TEMPERATURE: 98.5 F | HEART RATE: 102 BPM | SYSTOLIC BLOOD PRESSURE: 135 MMHG | WEIGHT: 241.18 LBS | HEIGHT: 71 IN | OXYGEN SATURATION: 95 % | BODY MASS INDEX: 33.77 KG/M2

## 2025-03-05 DIAGNOSIS — J31.0 MIXED RHINITIS: ICD-10-CM

## 2025-03-05 DIAGNOSIS — J45.40 MODERATE PERSISTENT ASTHMA WITHOUT COMPLICATION (HHS-HCC): Primary | ICD-10-CM

## 2025-03-05 DIAGNOSIS — N50.812 PAIN IN BOTH TESTICLES: ICD-10-CM

## 2025-03-05 DIAGNOSIS — N50.811 PAIN IN BOTH TESTICLES: ICD-10-CM

## 2025-03-05 DIAGNOSIS — J30.9 ALLERGIC RHINOCONJUNCTIVITIS: ICD-10-CM

## 2025-03-05 DIAGNOSIS — H10.10 ALLERGIC RHINOCONJUNCTIVITIS: ICD-10-CM

## 2025-03-05 PROCEDURE — 99214 OFFICE O/P EST MOD 30 MIN: CPT | Performed by: ALLERGY & IMMUNOLOGY

## 2025-03-05 PROCEDURE — RXMED WILLOW AMBULATORY MEDICATION CHARGE

## 2025-03-05 RX ORDER — FLUTICASONE PROPIONATE 50 MCG
2 SPRAY, SUSPENSION (ML) NASAL DAILY
Qty: 48 G | Refills: 3 | Status: SHIPPED | OUTPATIENT
Start: 2025-03-05 | End: 2026-03-05

## 2025-03-05 RX ORDER — MOMETASONE FUROATE AND FORMOTEROL FUMARATE DIHYDRATE 100; 5 UG/1; UG/1
2 AEROSOL RESPIRATORY (INHALATION)
Qty: 39 G | Refills: 3 | Status: SHIPPED | OUTPATIENT
Start: 2025-03-05 | End: 2025-11-26

## 2025-03-05 RX ORDER — CETIRIZINE HYDROCHLORIDE 10 MG/1
10 TABLET ORAL DAILY
Qty: 90 TABLET | Refills: 3 | Status: SHIPPED | OUTPATIENT
Start: 2025-03-05

## 2025-03-05 RX ORDER — AZELASTINE HYDROCHLORIDE 0.5 MG/ML
1 SOLUTION/ DROPS OPHTHALMIC 2 TIMES DAILY PRN
Qty: 6 ML | Refills: 3 | Status: SHIPPED | OUTPATIENT
Start: 2025-03-05 | End: 2026-03-05

## 2025-03-05 RX ORDER — AZELASTINE 1 MG/ML
2 SPRAY, METERED NASAL 2 TIMES DAILY
Qty: 90 ML | Refills: 3 | Status: SHIPPED | OUTPATIENT
Start: 2025-03-05 | End: 2026-03-05

## 2025-03-05 NOTE — PROGRESS NOTES
"Patricia Calhoun presents for follow up evaluation today.      Patient provides the following history:    Allergies are controlled  Asthma is well controlled    Meds:  Dulera 2 puff 1 x daily and as needed for pretreatment for working out     He had fever and body aches and congestion for 2 months nov-jan  No antibiotics  Treated with tylenol  No some coughing but not bad    Doing well  Starting [employer] in [employer] business    No OCS  No ER  [place] April 2024  FEV 91%    ROS:  Pertinent positives and negatives have been assessed in the HPI.  All others systems have been reviewed and are negative for complaint.      Vital signs:  /88 (BP Location: Right arm, Patient Position: Sitting)   Pulse 102   Temp 36.9 °C (98.5 °F) (Oral)   Ht 1.803 m (5' 10.98\")   Wt 109 kg (241 lb 2.9 oz)   SpO2 95%   BMI 33.65 kg/m²     Physical Exam:  GENERAL: Alert, oriented and in no acute distress.     HEENT: EYES: No conjunctival injection or cobblestoning. Nose: nasal turbinates mildly edematous and are not boggy.  There is no mucous stranding, polyps, or blood    noted. EARS: Tympanic membranes are clear. MOUTH: moist and pink with no exudates, ulcers, or thrush. NECK: is supple, without adenopathy.  No upper airway stridor noted.       HEART: regular rate and rhythm.       LUNGS: Clear to auscultation bilaterally. No wheezing, rhonchi or rales.        ABDOMEN: Positive bowel sounds, soft, nontender, nondistended.       EXTREMITIES: No clubbing or edema.        NEURO:  Normal affect.  Gait normal.      SKIN: No rash, hives, or angioedema noted      Impression:      Assessment and Plan:  Patricia is a patient for follow up of allergic/mixed rhinitis and asthma and VCD, he has been relatively controlled despite the discontinuation of SCIT, SPT after SCIT complete positive to aspergillus.  most of his resp symptoms are more consistent with VCD  Current issue is burning face without rash, unknown etiology, recommended moisture " care.     rhintis is primarily non-allergic: moderate controlled on flonase/zyrtec  ipratropium s/p montelukast because previous discontinuation not convinced that depressed mood was related     In terms of asthma/vcd: continued ST, continue dulera 2p 1 x daily and has  device present at visit, recommended refill     Not a current issue: s/p referral to GI for throat clearing and felling of tightness consistent with RAPHAEL and s/p  omeprazole/miralax     Ongoing  bilateral eyelid angioedema: prevoiusly ordered UA to assess for proteinuria     previous Decision to stop SCIT s/p 3.5 years of completion with modest benefit ((asthma was better controlled ie: less exacerbations and less OCS, but same requirement of ICS/LABA dosing), but rhinitis and conjunctivitis was not improved on SCIT),      ----------------------------------      historically:  elevated BPs long standing: seen by nephrology, on no current treatment     severe persistent asthma. S/p with Dr. Spann:   S/p spiriva 1.25mcg/act since 2018  S/p xolair in years past (remonte history a few injections)     AR/AC  started SCIT 2016, Maintenance achieved 2017, but since then has been back and forth with rebuilid for being late and for new vials. so re-reached maintenance 2018  continued maintenance therapy from 2018 through Dec 2020--reached goal to do SCIT at least through 2021 so 3 full years since re-reaching maintenance in 2018  after his last injection Dec 2020, he was 3 months late, was having large locals with injections and was due for new vials  advised to stop SCIT and reassess with repeat skin testing  Prior test results: +dust mite, mold, ragweed,cat,tree, weed,cockroach   was 3 months late, was having large locals with injections and was due for new vials  advised to stop SCIT and reassess with repeat skin testing  Prior test results: +dust mite, mold, ragweed,cat,tree, weed,cockroach

## 2025-03-05 NOTE — PATIENT INSTRUCTIONS
Your asthma is well controlled  Lets cut your dose by 50%  Still dulera inhaler but dose is now dulera 100 mcg    Take 2 puffs Dulera 100 1 x daily   With 2 puffs as needed with symptoms to max of 12 puffs per day.     Use zyrtec, flonase, nasal azelastine sprays as needed and antihistamine eye drop as needed    Schedule spirometry in May or 2025 to assess lung function after the dose decrease.    Call [phone] to schedule the breathing test    Referral was placed for urology call [phone] to schedule   If you have worse acute pain to the ER for ultrasound    Follow up 6 months   It was a pleasure to see you in clinic today  Call our Nurse Line with questions: [phone]    Call our [place] for visit follow up [phone]          Follow up 6 months  It was a pleasure to see you in clinic today  Call our Nurse Line with questions: [phone]     Call our [place] for visit follow up [phone]

## 2025-03-10 ENCOUNTER — PHARMACY VISIT (OUTPATIENT)
Dept: PHARMACY | Facility: CLINIC | Age: 25
End: 2025-03-10
Payer: MEDICAID

## 2025-03-10 ENCOUNTER — APPOINTMENT (OUTPATIENT)
Dept: RADIOLOGY | Facility: HOSPITAL | Age: 25
End: 2025-03-10
Payer: COMMERCIAL

## 2025-03-10 ENCOUNTER — HOSPITAL ENCOUNTER (EMERGENCY)
Facility: HOSPITAL | Age: 25
Discharge: HOME | End: 2025-03-10
Payer: COMMERCIAL

## 2025-03-10 VITALS
TEMPERATURE: 97.5 F | HEIGHT: 70 IN | DIASTOLIC BLOOD PRESSURE: 91 MMHG | HEART RATE: 103 BPM | OXYGEN SATURATION: 100 % | RESPIRATION RATE: 18 BRPM | SYSTOLIC BLOOD PRESSURE: 151 MMHG | WEIGHT: 240 LBS | BODY MASS INDEX: 34.36 KG/M2

## 2025-03-10 DIAGNOSIS — K59.00 CONSTIPATION, UNSPECIFIED CONSTIPATION TYPE: Primary | ICD-10-CM

## 2025-03-10 LAB
ALBUMIN SERPL BCP-MCNC: 4.5 G/DL (ref 3.4–5)
ALP SERPL-CCNC: 74 U/L (ref 33–120)
ALT SERPL W P-5'-P-CCNC: 35 U/L (ref 10–52)
ANION GAP SERPL CALC-SCNC: 12 MMOL/L (ref 10–20)
APPEARANCE UR: CLEAR
AST SERPL W P-5'-P-CCNC: 24 U/L (ref 9–39)
BASOPHILS # BLD AUTO: 0.03 X10*3/UL (ref 0–0.1)
BASOPHILS NFR BLD AUTO: 0.5 %
BILIRUB SERPL-MCNC: 0.5 MG/DL (ref 0–1.2)
BILIRUB UR STRIP.AUTO-MCNC: NEGATIVE MG/DL
BUN SERPL-MCNC: 6 MG/DL (ref 6–23)
CALCIUM SERPL-MCNC: 9.5 MG/DL (ref 8.6–10.6)
CHLORIDE SERPL-SCNC: 102 MMOL/L (ref 98–107)
CO2 SERPL-SCNC: 29 MMOL/L (ref 21–32)
COLOR UR: COLORLESS
CREAT SERPL-MCNC: 1 MG/DL (ref 0.5–1.3)
EGFRCR SERPLBLD CKD-EPI 2021: >90 ML/MIN/1.73M*2
EOSINOPHIL # BLD AUTO: 0.07 X10*3/UL (ref 0–0.7)
EOSINOPHIL NFR BLD AUTO: 1.2 %
ERYTHROCYTE [DISTWIDTH] IN BLOOD BY AUTOMATED COUNT: 15.9 % (ref 11.5–14.5)
GLUCOSE SERPL-MCNC: 90 MG/DL (ref 74–99)
GLUCOSE UR STRIP.AUTO-MCNC: NORMAL MG/DL
HCT VFR BLD AUTO: 45.4 % (ref 41–52)
HGB BLD-MCNC: 14.6 G/DL (ref 13.5–17.5)
HOLD SPECIMEN: NORMAL
IMM GRANULOCYTES # BLD AUTO: 0.06 X10*3/UL (ref 0–0.7)
IMM GRANULOCYTES NFR BLD AUTO: 1.1 % (ref 0–0.9)
KETONES UR STRIP.AUTO-MCNC: NEGATIVE MG/DL
LEUKOCYTE ESTERASE UR QL STRIP.AUTO: NEGATIVE
LYMPHOCYTES # BLD AUTO: 1.22 X10*3/UL (ref 1.2–4.8)
LYMPHOCYTES NFR BLD AUTO: 21.7 %
MCH RBC QN AUTO: 22.3 PG (ref 26–34)
MCHC RBC AUTO-ENTMCNC: 32.2 G/DL (ref 32–36)
MCV RBC AUTO: 69 FL (ref 80–100)
MONOCYTES # BLD AUTO: 0.52 X10*3/UL (ref 0.1–1)
MONOCYTES NFR BLD AUTO: 9.2 %
NEUTROPHILS # BLD AUTO: 3.73 X10*3/UL (ref 1.2–7.7)
NEUTROPHILS NFR BLD AUTO: 66.3 %
NITRITE UR QL STRIP.AUTO: NEGATIVE
NRBC BLD-RTO: 0 /100 WBCS (ref 0–0)
PH UR STRIP.AUTO: 7 [PH]
PLATELET # BLD AUTO: 205 X10*3/UL (ref 150–450)
POTASSIUM SERPL-SCNC: 3.9 MMOL/L (ref 3.5–5.3)
PROT SERPL-MCNC: 7.7 G/DL (ref 6.4–8.2)
PROT UR STRIP.AUTO-MCNC: NEGATIVE MG/DL
RBC # BLD AUTO: 6.56 X10*6/UL (ref 4.5–5.9)
RBC # UR STRIP.AUTO: NEGATIVE MG/DL
SODIUM SERPL-SCNC: 139 MMOL/L (ref 136–145)
SP GR UR STRIP.AUTO: 1
UROBILINOGEN UR STRIP.AUTO-MCNC: NORMAL MG/DL
WBC # BLD AUTO: 5.6 X10*3/UL (ref 4.4–11.3)

## 2025-03-10 PROCEDURE — 81003 URINALYSIS AUTO W/O SCOPE: CPT | Performed by: PHYSICIAN ASSISTANT

## 2025-03-10 PROCEDURE — 99285 EMERGENCY DEPT VISIT HI MDM: CPT | Mod: 25

## 2025-03-10 PROCEDURE — 74177 CT ABD & PELVIS W/CONTRAST: CPT

## 2025-03-10 PROCEDURE — 2500000004 HC RX 250 GENERAL PHARMACY W/ HCPCS (ALT 636 FOR OP/ED): Mod: SE | Performed by: PHYSICIAN ASSISTANT

## 2025-03-10 PROCEDURE — 80053 COMPREHEN METABOLIC PANEL: CPT | Performed by: PHYSICIAN ASSISTANT

## 2025-03-10 PROCEDURE — RXMED WILLOW AMBULATORY MEDICATION CHARGE

## 2025-03-10 PROCEDURE — 36415 COLL VENOUS BLD VENIPUNCTURE: CPT | Performed by: PHYSICIAN ASSISTANT

## 2025-03-10 PROCEDURE — 99285 EMERGENCY DEPT VISIT HI MDM: CPT | Performed by: PHYSICIAN ASSISTANT

## 2025-03-10 PROCEDURE — 2550000001 HC RX 255 CONTRASTS: Mod: SE | Performed by: PHYSICIAN ASSISTANT

## 2025-03-10 PROCEDURE — 85025 COMPLETE CBC W/AUTO DIFF WBC: CPT | Performed by: PHYSICIAN ASSISTANT

## 2025-03-10 PROCEDURE — 96374 THER/PROPH/DIAG INJ IV PUSH: CPT | Mod: 59

## 2025-03-10 RX ORDER — DOCUSATE SODIUM 100 MG/1
100 CAPSULE, LIQUID FILLED ORAL 2 TIMES DAILY
Qty: 60 CAPSULE | Refills: 0 | Status: SHIPPED | OUTPATIENT
Start: 2025-03-10 | End: 2025-04-09

## 2025-03-10 RX ORDER — POLYETHYLENE GLYCOL 3350 17 G/17G
17 POWDER, FOR SOLUTION ORAL DAILY
Qty: 3 PACKET | Refills: 0 | Status: SHIPPED | OUTPATIENT
Start: 2025-03-10 | End: 2025-03-13

## 2025-03-10 RX ORDER — KETOROLAC TROMETHAMINE 15 MG/ML
15 INJECTION, SOLUTION INTRAMUSCULAR; INTRAVENOUS ONCE
Status: COMPLETED | OUTPATIENT
Start: 2025-03-10 | End: 2025-03-10

## 2025-03-10 RX ADMIN — IOHEXOL 90 ML: 350 INJECTION, SOLUTION INTRAVENOUS at 10:54

## 2025-03-10 RX ADMIN — KETOROLAC TROMETHAMINE 15 MG: 15 INJECTION, SOLUTION INTRAMUSCULAR; INTRAVENOUS at 10:35

## 2025-03-10 ASSESSMENT — PAIN SCALES - GENERAL
PAINLEVEL_OUTOF10: 6
PAINLEVEL_OUTOF10: 6

## 2025-03-10 ASSESSMENT — LIFESTYLE VARIABLES
EVER FELT BAD OR GUILTY ABOUT YOUR DRINKING: NO
TOTAL SCORE: 0
HAVE PEOPLE ANNOYED YOU BY CRITICIZING YOUR DRINKING: NO
HAVE YOU EVER FELT YOU SHOULD CUT DOWN ON YOUR DRINKING: NO
EVER HAD A DRINK FIRST THING IN THE MORNING TO STEADY YOUR NERVES TO GET RID OF A HANGOVER: NO

## 2025-03-10 ASSESSMENT — COLUMBIA-SUICIDE SEVERITY RATING SCALE - C-SSRS
1. IN THE PAST MONTH, HAVE YOU WISHED YOU WERE DEAD OR WISHED YOU COULD GO TO SLEEP AND NOT WAKE UP?: NO
6. HAVE YOU EVER DONE ANYTHING, STARTED TO DO ANYTHING, OR PREPARED TO DO ANYTHING TO END YOUR LIFE?: NO
2. HAVE YOU ACTUALLY HAD ANY THOUGHTS OF KILLING YOURSELF?: NO

## 2025-03-10 ASSESSMENT — PAIN DESCRIPTION - LOCATION: LOCATION: GROIN

## 2025-03-10 ASSESSMENT — PAIN - FUNCTIONAL ASSESSMENT: PAIN_FUNCTIONAL_ASSESSMENT: 0-10

## 2025-03-10 NOTE — ED PROVIDER NOTES
"Emergency Department Encounter  [facility] EMERGENCY MEDICINE    Patient: Patricia Calhoun  MRN: [id number]  : 2000  Date of Evaluation: 3/10/2025  ED Provider: Erika Call PA-C      Chief Complaint       Chief Complaint   Patient presents with    Groin Pain     HPI    Patricia Calhoun is a 24 y.o. male who presents to the emergency department presenting for b/l groin pain. He was seen in the ED 25 and diagnosed with L inguinal strain. He was prescribed Robaxin and ibuprofen, however states that the pain has not improved with the medications and has persisted since that visit. His pain is in the entire groin and scrotal region. He states it is worsened by positional changes but that it is not consistent with the same movements all the time. He also reports episodes of dark red blood in his stool the last 2 days and also has noticed blood when wiping following a bowel movement. He states this has happened in the past when he has an \"upset stomach\". He reports some nausea but no episodes of vomiting. He adds that at first, his scrotum seemed \"low\" but now it seems \"higher than usual\". He has pain with bowel movements. He denies any urinary changes other than feeling as though his bladder \"cannot hold as much\". He also reports that he had what looked like an \"enlarged vein\" on his upper thigh when his pain first started but that it resolved. He denies bulges in the inguinal region. Here for evaluation approx 1 month ago when sxs onset; negative scrotal US with dopplers, normal UA, STD testing. Pt reports he has not been sexually active since he last visit. Hasn't noticed any significant changes in symptomology since prior eval. Saw PCP who referred to urology, next appt on 3/28.    ROS:     Review of Systems  14 systems reviewed and otherwise acutely negative except as in the HPI.    Past History     Past Medical History:   Diagnosis Date    Acute maxillary sinusitis, unspecified " 11/14/2016    Acute maxillary sinusitis    Allergic     Chondrocostal junction syndrome (tietze) 04/25/2017    Costochondritis    Constipation     GERD (gastroesophageal reflux disease)     Other adverse food reactions, not elsewhere classified, initial encounter 07/22/2016    Adverse food reaction    Other conditions influencing health status 11/15/2016    History of cough    Otitis media, unspecified, left ear 02/14/2017    Left acute otitis media    Patient's noncompliance with other medical treatment and regimen due to unspecified reason 08/28/2015    Current non-adherence to medical treatment    Patient's unintentional underdosing of medication regimen for other reason 09/17/2016    Patient's unintentional underdosing of medication regimen for other reason    Personal history of other diseases of the respiratory system 11/13/2018    History of acute sinusitis    Severe persistent asthma, uncomplicated ([facility]) 10/17/2017    Asthma, severe persistent    Unspecified asthma with (acute) exacerbation ([facility]) 06/17/2015    Asthma exacerbation    Viral conjunctivitis, unspecified 12/12/2017    Acute viral conjunctivitis of right eye     Past Surgical History:   Procedure Laterality Date    OTHER SURGICAL HISTORY  09/14/2016    Thyroglossal Duct Cyst Excision     Social History     Socioeconomic History    Marital status: Single   Tobacco Use    Smoking status: Never    Smokeless tobacco: Never   Substance and Sexual Activity    Alcohol use: Not Currently    Drug use: Never    Sexual activity: Not Currently   Social History Narrative    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 albuterol (Proventil HFA) 90 mcg/actuation inhaler Inhale 2-4 puffs see administration instructions. Every 4-6 hours as needed for cough, wheeze or shortness of breath, Historical Med       !! albuterol (Ventolin HFA) 90 mcg/actuation inhaler Inhale 1-2 puffs see administration instructions. Take every 4-6 hours prn, Historical Med      albuterol 2.5 mg /3 mL (0.083 %) nebulizer solution Take 3 mL (2.5 mg) by nebulization. Every 4-6 hours prn wheezing, Starting Tue 10/2/2018, Historical Med      !! albuterol 90 mcg/actuation inhaler Inhale 2 puffs every 4 hours if needed for shortness of breath., Starting Thu 1/17/2019, Historical Med      !! azelastine (Astelin) 137 mcg (0.1 %) nasal spray Administer 2 sprays into each nostril 2 times a day., Starting Tue 9/4/2018, Historical Med      !! azelastine (Astelin) 137 mcg (0.1 %) nasal spray Administer 2 sprays into each nostril 2 times a day. Use in each nostril as directed, Starting Wed 3/5/2025, Until Thu 3/5/2026, Normal      azelastine (Optivar) 0.05 % ophthalmic solution Administer 1 drop into both eyes 2 times a day as needed (red watery eyes)., Starting Wed 3/5/2025, Until Thu 3/5/2026 at 2359, Normal      benzoyl peroxide 5 % lotion 1 Application, Historical Med      benzoyl peroxide cleanser (Benzac AC) 2.5 % cleanser topical wash 1 Application, Historical Med      bisacodyl (Dulcolax, bisacodyl,) 5 mg EC tablet Take 1 tablet (5 mg) by mouth see administration instructions. 1 tab(s) orally once to start and end bowel clean out; take 6x Miralax in between, Starting Mon 10/8/2018, Historical Med      !! cetirizine (ZyrTEC) 10 mg tablet Take 1 tablet (10 mg) by mouth once daily., Starting Wed 9/4/2024, Until Sat 8/30/2025, Normal      !! cetirizine (ZyrTEC) 10 mg tablet Take 1 tablet (10 mg) by mouth once daily., Starting Wed 3/5/2025, Normal      clindamycin (Cleocin T) 1 % lotion 1 Application, Historical Med      cyanocobalamin (Vitamin B-12) 100 mcg tablet Take 1 tablet (100 mcg) by mouth once daily., Historical Med      !! fluticasone (Flonase) 50 mcg/actuation nasal spray Administer 2 sprays into each nostril once daily. Shake gently.  Before first use, prime pump. After use, clean tip and replace cap., Starting Wed 9/4/2024, Until Thu 9/4/2025, Normal      !! fluticasone (Flonase) 50 mcg/actuation nasal spray Administer 2 sprays into each nostril once daily., Starting Wed 3/5/2025, Until Thu 3/5/2026, Normal      ipratropium (Atrovent) 42 mcg (0.06 %) nasal spray Administer 2 sprays into each nostril 3 times a day., Starting Mon 10/16/2023, Until Tue 10/15/2024, Normal      lansoprazole (Prevacid) 30 mg DR capsule Take 1 capsule (30 mg) by mouth once daily in the morning. Take before meals. Do not crush or chew.   Take 1 pill 30 minutes before breakfast and dinner for 2 weeks, Then every other day thereafter, Historical Med      methocarbamol (Robaxin) 500 mg tablet Take 2 tablets (1,000 mg) by mouth every 8 hours if needed for muscle spasms for up to 10 days., Starting Mon 1/20/2025, Until Thu 1/30/2025 at 2359, Print      !! mometasone-formoterol (Dulera 200) 200-5 mcg/actuation inhaler 2 puffs 1 x daily with 2 puffs as needed SMART. Rinse mouth with water after use to reduce aftertaste and incidence of candidiasis. Do not swallow., Normal      mometasone-formoterol (Dulera 50) 50-5 mcg/actuation HFA aerosol inhaler inhaler Inhale 2 puffs see administration instructions. Every 4-6 hours for smart therapy, Historical Med      mometasone-formoterol (Dulera) 100-5 mcg/actuation inhaler Inhale 2 puffs 2 times a day. Rinse mouth with water after use to reduce aftertaste and incidence of candidiasis. Do not swallow., Starting Wed 3/5/2025, Until Wed 11/26/2025, Normal      !! mometasone-formoterol (Dulera) 200-5 mcg/actuation inhaler Inhale 2 puffs 2 times a day. Inhale into the lungs. Rinse mouth after use, Starting Wed 3/18/2015, Historical Med      montelukast (Singulair) 10 mg tablet Take 1 tablet (10 mg) by mouth once daily., Starting Wed 4/3/2024, Until Mon 9/30/2024, Normal      olopatadine (Pataday) 0.2 % ophthalmic solution 1 drop once  daily. Place into affected eye, Historical Med      !! omeprazole (PriLOSEC) 10 mg DR capsule Take 2 capsules (20 mg) by mouth once daily., Historical Med      !! omeprazole (PriLOSEC) 20 mg DR capsule Take 2 capsules (40 mg) by mouth once daily in the morning. Take before meals. Take 30 minutes prior to breakfast, Starting Mon 2/21/2022, Historical Med      ondansetron ODT (Zofran-ODT) 4 mg disintegrating tablet Take 1-2 tablets (4-8 mg) by mouth every 8 hours if needed for nausea., Starting Mon 2/21/2022, Historical Med      polyethylene glycol-electrolytes (polyethylene glycol) 420 gram solution Take by mouth., Starting Thu 8/25/2022, Historical Med      psyllium (MetamuciL) 0.4 gram capsule Take 1 capsule by mouth 2 times a day., Starting Thu 8/25/2022, Historical Med       !! - Potential duplicate medications found. Please discuss with provider.        Allergies   Allergen Reactions    Mold Wheezing        Physical Exam       ED Triage Vitals [03/10/25 0948]   Temperature Heart Rate Respirations BP   36.4 °C (97.5 °F) (!) 103 18 (!) 151/91      Pulse Ox Temp src Heart Rate Source Patient Position   100 % -- -- --      BP Location FiO2 (%)     -- --         Physical Exam    Physical Exam:     VS: As documented in the triage note and EMR flowsheet from this visit were reviewed.    Appearance: Alert, oriented, cooperative, in no acute distress. Well nourished & well hydrated.    Skin: Warm, intact and dry. No lesions, rash, or petechiae.    Neck: Supple, without lymphadenopathy.     Pulmonary: Clear bilaterally with good chest wall excursion. No rales, rhonchi or wheezing. No accessory muscle use or stridor.     Cardiac: Normal S1, S2 without murmur, rub, gallop or extrasystole.     Abdomen: Soft, nontender, active bowel sounds. No palpable organomegaly. No rebound or guarding. No CVA tenderness. No abdominal bulging noted on exam.    Genitourinary: Chaperone present. External exam unremarkable - no evidence of  lesions, vesicles. +TTP to bialtear inguinal folds - no associated rash, overlying erythema, edema or increased warmth. No focal scrotal or penis TTP.    Musculoskeletal: Spontaneously moving all extremities without limitation. Extremities warm and well-perfused, capillary refill less than 2 seconds.     Diagnostics   Labs:  Labs Reviewed   CBC WITH AUTO DIFFERENTIAL - Abnormal       Result Value    WBC 5.6      nRBC 0.0      RBC 6.56 (*)     Hemoglobin 14.6      Hematocrit 45.4      MCV 69 (*)     MCH 22.3 (*)     MCHC 32.2      RDW 15.9 (*)     Platelets 205      Neutrophils % 66.3      Immature Granulocytes %, Automated 1.1 (*)     Lymphocytes % 21.7      Monocytes % 9.2      Eosinophils % 1.2      Basophils % 0.5      Neutrophils Absolute 3.73      Immature Granulocytes Absolute, Automated 0.06      Lymphocytes Absolute 1.22      Monocytes Absolute 0.52      Eosinophils Absolute 0.07      Basophils Absolute 0.03     URINALYSIS WITH REFLEX CULTURE AND MICROSCOPIC - Abnormal    Color, Urine Colorless (*)     Appearance, Urine Clear      Specific Gravity, Urine 1.003 (*)     pH, Urine 7.0      Protein, Urine NEGATIVE      Glucose, Urine Normal      Blood, Urine NEGATIVE      Ketones, Urine NEGATIVE      Bilirubin, Urine NEGATIVE      Urobilinogen, Urine Normal      Nitrite, Urine NEGATIVE      Leukocyte Esterase, Urine NEGATIVE     COMPREHENSIVE METABOLIC PANEL - Normal    Glucose 90      Sodium 139      Potassium 3.9      Chloride 102      Bicarbonate 29      Anion Gap 12      Urea Nitrogen 6      Creatinine 1.00      eGFR >90      Calcium 9.5      Albumin 4.5      Alkaline Phosphatase 74      Total Protein 7.7      AST 24      Bilirubin, Total 0.5      ALT 35     URINALYSIS WITH REFLEX CULTURE AND MICROSCOPIC    Narrative:     The following orders were created for panel order Urinalysis with Reflex Culture and Microscopic.  Procedure                               Abnormality         Status                    "  ---------                               -----------         ------                     Urinalysis with Reflex C...[[id number]]  Abnormal            Final result               Extra Urine Gray Tube[[id number]]                            In process                   Please view results for these tests on the individual orders.   EXTRA URINE GRAY TUBE     Radiographs:  CT abdomen pelvis w IV contrast   Final Result   1. The scrotums/testicles appear unremarkable without evidence of the   focal lesions.   2. No evidence of free or loculated fluid collection, free   intraperitoneal air or fat stranding.   3. Moderate colonic stool burden noted.        I personally reviewed the images/study and I agree with the findings   as stated by Resident Lamar Miller. This study was interpreted at   [facility], [place], [place].        MACRO:   None        Signed by: Jt Murcia 3/10/2025 2:37 PM   Dictation workstation:   [id number]          ED Course   Visit Vitals  BP (!) 151/91   Pulse (!) 103   Temp 36.4 °C (97.5 °F)   Resp 18   Ht 1.778 m (5' 10\")   Wt 109 kg (240 lb)   SpO2 100%   BMI 34.44 kg/m²   Smoking Status Never   BSA 2.32 m²     Medications   ketorolac (Toradol) injection 15 mg (15 mg intravenous Given 3/10/25 1035)   iohexol (OMNIPaque) 350 mg iodine/mL solution 90 mL (90 mL intravenous Given 3/10/25 1054)       Medical Decision Making   Labs benign. UA noninfected.  CT shows constipation. Rx for colace, miralax. Referred back to his PCP, keep upcoming urology appointment.    Final Impression      1. Constipation, unspecified constipation type          DISPOSITION  Disposition: discharge  Patient condition is: Stable    Comment: Please note this report has been produced using speech recognition software and may contain errors related to that system including errors in grammar, punctuation, and spelling, as well as words and phrases that may be inappropriate.  If there are any " questions or concerns please feel free to contact the dictating provider for clarification.    EASTON Oliver PA-C  03/10/25 1520

## 2025-03-10 NOTE — Clinical Note
Patricia Calhoun was seen and treated in our emergency department on 3/10/2025.  He may return to work on 03/12/2025.       If you have any questions or concerns, please don't hesitate to call.      Erika Call PA-C

## 2025-03-10 NOTE — DISCHARGE INSTRUCTIONS
CT shows constipation.  No infection noted.  Please keep your urology appointment that has already been scheduled.

## 2025-03-19 ENCOUNTER — APPOINTMENT (OUTPATIENT)
Dept: PRIMARY CARE | Facility: CLINIC | Age: 25
End: 2025-03-19
Payer: COMMERCIAL

## 2025-03-28 ENCOUNTER — APPOINTMENT (OUTPATIENT)
Dept: UROLOGY | Facility: CLINIC | Age: 25
End: 2025-03-28
Payer: COMMERCIAL

## 2025-04-25 ENCOUNTER — OFFICE VISIT (OUTPATIENT)
Facility: HOSPITAL | Age: 25
End: 2025-04-25
Payer: COMMERCIAL

## 2025-04-25 VITALS
OXYGEN SATURATION: 96 % | DIASTOLIC BLOOD PRESSURE: 89 MMHG | BODY MASS INDEX: 33.01 KG/M2 | TEMPERATURE: 97.7 F | SYSTOLIC BLOOD PRESSURE: 136 MMHG | WEIGHT: 235.8 LBS | HEIGHT: 71 IN | HEART RATE: 96 BPM

## 2025-04-25 DIAGNOSIS — R11.0 NAUSEA: ICD-10-CM

## 2025-04-25 DIAGNOSIS — K64.4 EXTERNAL HEMORRHOID, BLEEDING: Primary | ICD-10-CM

## 2025-04-25 DIAGNOSIS — R42 DIZZINESS: ICD-10-CM

## 2025-04-25 DIAGNOSIS — R42 VERTIGO: ICD-10-CM

## 2025-04-25 DIAGNOSIS — K59.09 CHRONIC CONSTIPATION: ICD-10-CM

## 2025-04-25 PROCEDURE — 3079F DIAST BP 80-89 MM HG: CPT

## 2025-04-25 PROCEDURE — 99214 OFFICE O/P EST MOD 30 MIN: CPT

## 2025-04-25 PROCEDURE — 99214 OFFICE O/P EST MOD 30 MIN: CPT | Mod: GC

## 2025-04-25 PROCEDURE — 3075F SYST BP GE 130 - 139MM HG: CPT

## 2025-04-25 PROCEDURE — 3008F BODY MASS INDEX DOCD: CPT

## 2025-04-25 RX ORDER — MECLIZINE HYDROCHLORIDE 25 MG/1
25 TABLET ORAL 3 TIMES DAILY PRN
Qty: 30 TABLET | Refills: 1 | Status: SHIPPED | OUTPATIENT
Start: 2025-04-25 | End: 2026-04-25

## 2025-04-25 RX ORDER — PSYLLIUM HUSK 0.4 G
1 CAPSULE ORAL 2 TIMES DAILY
Qty: 60 CAPSULE | Refills: 1 | Status: SHIPPED | OUTPATIENT
Start: 2025-04-25 | End: 2025-05-25

## 2025-04-25 RX ORDER — POLYETHYLENE GLYCOL 3350 17 G/17G
17 POWDER, FOR SOLUTION ORAL DAILY
Qty: 30 PACKET | Refills: 1 | Status: SHIPPED | OUTPATIENT
Start: 2025-04-25 | End: 2025-06-24

## 2025-04-25 RX ORDER — DOCUSATE SODIUM 100 MG/1
100 CAPSULE, LIQUID FILLED ORAL 2 TIMES DAILY
Qty: 60 CAPSULE | Refills: 1 | Status: SHIPPED | OUTPATIENT
Start: 2025-04-25 | End: 2025-06-24

## 2025-04-25 ASSESSMENT — PAIN SCALES - GENERAL: PAINLEVEL_OUTOF10: 5

## 2025-04-25 NOTE — PROGRESS NOTES
I saw and evaluated the patient. I personally obtained the key and critical portions of the history and physical exam or was physically present for key and critical portions performed by the resident/fellow. I reviewed the resident/fellow's documentation and discussed the patient with the resident/fellow. I agree with the resident/fellow's medical decision making as documented in the note.    Isaias Fraser MD

## 2025-04-25 NOTE — PROGRESS NOTES
Subjective   Patient ID: Patricia Calhoun is a 24 y.o. male who presents for Follow-up (Nausea and abdominal pain /).    Hx anxiety, depression, asthma, and chronic constipation     # Left inguinal and scrotal pain    - Pain began 1/20/25 after masturbation; initially left-sided, now bilateral and positional    - Seen in ED 1/20/25, diagnosed with inguinal strain; started on Robaxin and ibuprofen -- both not helpful    - Returned to ED for persistent pain; CT: no testicular pathology, showed constipation    - Negative scrotal US, UA, STD panel; labs unremarkable    - Reports occasional groin discomfort, improved (now 2/10 from 8/10)    - Associates groin discomfort with urge to stool    - Longstanding history of constipation; previously on laxatives    - Seen at urgent care, prescribed magnesium citrate and Zofran; nausea persists, even post-BM    - Blood in stool this morning (with wiping); denies diarrhea    - History of external hemorrhoid    - Reports dizziness worsened by motion (e.g., in car); concerned about anxiety    - Admits poor diet with processed foods      Review of Systems  As per HPI   Previous history  Medical History[1]  Surgical History[2]  Social History[3]  Family History[4]  Allergies[5]  Current Outpatient Medications   Medication Instructions    albuterol (Proventil HFA) 90 mcg/actuation inhaler 2-4 puffs, See admin instructions    albuterol (Ventolin HFA) 90 mcg/actuation inhaler 1-2 puffs, See admin instructions    albuterol 90 mcg/actuation inhaler 2 puffs, Every 4 hours PRN    albuterol 2.5 mg    azelastine (Astelin) 137 mcg (0.1 %) nasal spray 2 sprays, Each Nostril, 2 times daily    azelastine (Astelin) 137 mcg (0.1 %) nasal spray 2 sprays, Each Nostril, 2 times daily, Use in each nostril as directed    azelastine (Optivar) 0.05 % ophthalmic solution 1 drop, Both Eyes, 2 times daily    azelastine (Optivar) 0.05 % ophthalmic solution 1 drop, Both Eyes, 2 times daily PRN    benzoyl peroxide  5 % lotion 1 Application    benzoyl peroxide cleanser (Benzac AC) 2.5 % cleanser topical wash 1 Application    bisacodyl (DULCOLAX (BISACODYL)) 5 mg, See admin instructions    cetirizine (ZYRTEC) 10 mg, oral, Daily    cetirizine (ZYRTEC) 10 mg, oral, Daily    clindamycin (Cleocin T) 1 % lotion 1 Application    cyanocobalamin (VITAMIN B-12) 100 mcg, oral, Daily    docusate sodium (COLACE) 100 mg, oral, 2 times daily    fluticasone (Flonase) 50 mcg/actuation nasal spray 2 sprays, Each Nostril, Daily, Shake gently. Before first use, prime pump. After use, clean tip and replace cap.    fluticasone (Flonase) 50 mcg/actuation nasal spray 2 sprays, Each Nostril, Daily    ipratropium (Atrovent) 42 mcg (0.06 %) nasal spray 2 sprays, Each Nostril, 3 times daily    lansoprazole (PREVACID) 30 mg, Daily before breakfast    meclizine (ANTIVERT) 25 mg, oral, 3 times daily PRN    methocarbamol (ROBAXIN) 1,000 mg, oral, Every 8 hours PRN    mometasone-formoterol (Dulera 200) 200-5 mcg/actuation inhaler 2 puffs 1 x daily with 2 puffs as needed SMART. Rinse mouth with water after use to reduce aftertaste and incidence of candidiasis. Do not swallow.    mometasone-formoterol (Dulera 50) 50-5 mcg/actuation HFA aerosol inhaler inhaler 2 puffs, inhalation, See admin instructions, Every 4-6 hours for smart therapy     mometasone-formoterol (Dulera) 100-5 mcg/actuation inhaler 2 puffs, inhalation, 2 times daily RT, Rinse mouth with water after use to reduce aftertaste and incidence of candidiasis. Do not swallow.    mometasone-formoterol (Dulera) 200-5 mcg/actuation inhaler 2 puffs, inhalation, 2 times daily RT, Inhale into the lungs. Rinse mouth after use     montelukast (SINGULAIR) 10 mg, oral, Daily    olopatadine (Pataday) 0.2 % ophthalmic solution 1 drop, Daily    omeprazole (PriLOSEC) 10 mg DR capsule 2 capsules, Daily    omeprazole (PriLOSEC) 20 mg DR capsule 2 capsules, oral, Daily before breakfast, Take 30 minutes prior to  breakfast     ondansetron ODT (Zofran-ODT) 4 mg disintegrating tablet 1-2 tablets, Every 8 hours PRN    polyethylene glycol (GLYCOLAX, MIRALAX) 17 g, oral, Daily, Mix 1 cap (17g) into 8 ounces of fluid.    polyethylene glycol-electrolytes (polyethylene glycol) 420 gram solution Take by mouth.    psyllium (MetamuciL) 0.4 gram capsule 1 capsule, oral, 2 times daily       Objective     Vitals:    04/25/25 0940   BP: 136/89   Pulse: 96   Temp: 36.5 °C (97.7 °F)   SpO2: 96%     Physical Exam  Abdominal:      Comments: Periumbilical TTP   Genitourinary:     Comments: Refused hernia exam  Neurological:      General: No focal deficit present.      Mental Status: He is alert.      Motor: No weakness.      Gait: Gait normal.   Psychiatric:         Mood and Affect: Mood normal.       Assessment/Plan   Patricia Calhoun is a 24 y.o. male with history of anxiety, depression, asthma, and chronic constipation  who presents for the concerns below:    #constipation  :: Persistent groin discomfort and rectal bleeding likely related to chronic constipation and straining.    :: Groin pain appears to improve after bowel movements; constipation may also contribute to nausea.    :: Motion-related dizziness may be vertigo (possible BPPV); no signs of acute pathology.  PLAN    - Resume bowel regimen: Colace, Miralax, increase fiber intake (metamucil)   - Trial Meclizine, refer to vestibular therapy    - GI referral for chronic nausea and constipation management    - Keep urology appointment on 3/28    - Monitor for worsening symptoms or new concerns      Problem List Items Addressed This Visit    None  Visit Diagnoses         External hemorrhoid, bleeding    -  Primary    Relevant Orders    Referral to Gastroenterology      Dizziness        Relevant Medications    meclizine (Antivert) 25 mg tablet    Other Relevant Orders    Referral to Physical Therapy      Nausea        Relevant Medications    meclizine (Antivert) 25 mg tablet    Other  Relevant Orders    Referral to Physical Therapy      Vertigo        Relevant Orders    Referral to Physical Therapy      Chronic constipation        Relevant Medications    psyllium (MetamuciL) 0.4 gram capsule    docusate sodium (Colace) 100 mg capsule    polyethylene glycol (Glycolax, Miralax) 17 gram packet    Other Relevant Orders    Referral to Gastroenterology          Return in : 1 month    Discussed with co-signer of note Dr. Fraser      Portions of this note were generated using digital voice recognition software, and may contain grammatical errors       Leonardo Ibanez, DO  Family Medicine PGY-3       [1]   Past Medical History:  Diagnosis Date    Acute maxillary sinusitis, unspecified 11/14/2016    Acute maxillary sinusitis    Allergic     Chondrocostal junction syndrome (tietze) 04/25/2017    Costochondritis    Constipation     GERD (gastroesophageal reflux disease)     Other adverse food reactions, not elsewhere classified, initial encounter 07/22/2016    Adverse food reaction    Other conditions influencing health status 11/15/2016    History of cough    Otitis media, unspecified, left ear 02/14/2017    Left acute otitis media    Patient's noncompliance with other medical treatment and regimen due to unspecified reason 08/28/2015    Current non-adherence to medical treatment    Patient's unintentional underdosing of medication regimen for other reason 09/17/2016    Patient's unintentional underdosing of medication regimen for other reason    Personal history of other diseases of the respiratory system 11/13/2018    History of acute sinusitis    Severe persistent asthma, uncomplicated (Multi) 10/17/2017    Asthma, severe persistent    Unspecified asthma with (acute) exacerbation ([facility]) 06/17/2015    Asthma exacerbation    Viral conjunctivitis, unspecified 12/12/2017    Acute viral conjunctivitis of right eye   [2]   Past Surgical History:  Procedure Laterality Date    OTHER SURGICAL HISTORY   09/14/2016    Thyroglossal Duct Cyst Excision   [3]   Social History  Tobacco Use    Smoking status: Never    Smokeless tobacco: Never   Substance Use Topics    Alcohol use: Not Currently    Drug use: Never   [4]   Family History  Problem Relation Name Age of Onset    Hypertension Mother      Diabetes Father      Eczema Father      Hypertension Father      Heart attack Father      Eczema Sister      Urolithiasis Mother's Sister      Other (dialysis patient) Father's Sister      Hypertension Maternal Grandmother      Hypertension Paternal Grandmother      Heart attack Paternal Grandfather      Asthma Child      Gout Maternal Great-Grandfather      Nephrolithiasis Maternal Cousin      Lupus Maternal Cousin      Other (dialysis patient) Paternal Cousin     [5]   Allergies  Allergen Reactions    Mold Wheezing

## 2025-04-30 ENCOUNTER — APPOINTMENT (OUTPATIENT)
Dept: UROLOGY | Facility: CLINIC | Age: 25
End: 2025-04-30
Payer: COMMERCIAL

## 2025-05-16 ENCOUNTER — APPOINTMENT (OUTPATIENT)
Facility: HOSPITAL | Age: 25
End: 2025-05-16
Payer: COMMERCIAL

## 2025-06-23 ENCOUNTER — HOSPITAL ENCOUNTER (OUTPATIENT)
Dept: RESPIRATORY THERAPY | Facility: HOSPITAL | Age: 25
Discharge: HOME | End: 2025-06-23
Payer: COMMERCIAL

## 2025-06-23 DIAGNOSIS — J45.40 MODERATE PERSISTENT ASTHMA WITHOUT COMPLICATION (HHS-HCC): ICD-10-CM

## 2025-06-23 LAB
MGC ASCENT PFT - FEV1 - POST: 4.46
MGC ASCENT PFT - FEV1 - PRE: 4.33
MGC ASCENT PFT - FEV1 - PREDICTED: 4.58
MGC ASCENT PFT - FVC - POST: 6.03
MGC ASCENT PFT - FVC - PRE: 6.04
MGC ASCENT PFT - FVC - PREDICTED: 5.43

## 2025-06-23 PROCEDURE — 2500000001 HC RX 250 WO HCPCS SELF ADMINISTERED DRUGS (ALT 637 FOR MEDICARE OP): Mod: SE | Performed by: ALLERGY & IMMUNOLOGY

## 2025-06-23 PROCEDURE — 94010 BREATHING CAPACITY TEST: CPT

## 2025-06-23 PROCEDURE — 94010 BREATHING CAPACITY TEST: CPT | Performed by: INTERNAL MEDICINE

## 2025-06-23 RX ORDER — ALBUTEROL SULFATE 0.83 MG/ML
3 SOLUTION RESPIRATORY (INHALATION) ONCE
Status: COMPLETED | OUTPATIENT
Start: 2025-06-23 | End: 2025-06-23

## 2025-06-23 RX ORDER — ALBUTEROL SULFATE 90 UG/1
1 INHALANT RESPIRATORY (INHALATION) ONCE
Status: COMPLETED | OUTPATIENT
Start: 2025-06-23 | End: 2025-06-23

## 2025-06-23 RX ADMIN — ALBUTEROL SULFATE 4 PUFF: 90 AEROSOL, METERED RESPIRATORY (INHALATION) at 15:15

## 2025-07-14 ENCOUNTER — TELEPHONE (OUTPATIENT)
Dept: ALLERGY | Facility: CLINIC | Age: 25
End: 2025-07-14
Payer: COMMERCIAL

## 2025-07-14 NOTE — TELEPHONE ENCOUNTER
I am not sure if your breathing test result was reviewed with you when you had it completed but it was 94% which is good baseline function.  If your symptoms are well controlled I wouldn't suggest any changes to your maintenance inhaler regimen; call the office if you develop uncontrolled symptoms

## 2025-07-21 ENCOUNTER — OFFICE VISIT (OUTPATIENT)
Dept: ALLERGY | Facility: HOSPITAL | Age: 25
End: 2025-07-21
Payer: COMMERCIAL

## 2025-07-21 VITALS
TEMPERATURE: 98.1 F | DIASTOLIC BLOOD PRESSURE: 88 MMHG | WEIGHT: 234.6 LBS | OXYGEN SATURATION: 99 % | HEART RATE: 95 BPM | RESPIRATION RATE: 16 BRPM | SYSTOLIC BLOOD PRESSURE: 135 MMHG | BODY MASS INDEX: 32.72 KG/M2

## 2025-07-21 DIAGNOSIS — J30.9 ALLERGIC RHINOCONJUNCTIVITIS: ICD-10-CM

## 2025-07-21 DIAGNOSIS — J45.40 MODERATE PERSISTENT ASTHMA WITHOUT COMPLICATION (HHS-HCC): Primary | ICD-10-CM

## 2025-07-21 DIAGNOSIS — H10.10 ALLERGIC RHINOCONJUNCTIVITIS: ICD-10-CM

## 2025-07-21 PROCEDURE — 99214 OFFICE O/P EST MOD 30 MIN: CPT | Performed by: ALLERGY & IMMUNOLOGY

## 2025-07-21 PROCEDURE — 1036F TOBACCO NON-USER: CPT | Performed by: ALLERGY & IMMUNOLOGY

## 2025-07-21 PROCEDURE — 3079F DIAST BP 80-89 MM HG: CPT | Performed by: ALLERGY & IMMUNOLOGY

## 2025-07-21 PROCEDURE — 3075F SYST BP GE 130 - 139MM HG: CPT | Performed by: ALLERGY & IMMUNOLOGY

## 2025-07-21 SDOH — SOCIAL STABILITY: SOCIAL NETWORK: DID YOU HAVE TO LIMIT YOUR SOCIAL ACTIVITIES (SUCH AS VISITING FWITH FRIENDS/ RELATIVES OR PLAYING)?: NO

## 2025-07-21 NOTE — PATIENT INSTRUCTIONS
Your asthma is well controlled  Lets keep your dulera at 2 puffs of 100mcg every day    With 2 puffs as needed with symptoms to max of 12 puffs per day.     Use zyrtec, flonase, nasal azelastine sprays as needed and antihistamine eye drop as needed     Follow up 6 months   It was a pleasure to see you in clinic today  Call our Nurse Line with questions: [phone]     Call our  for visit follow up [phone]           Follow up 6 months  It was a pleasure to see you in clinic today  Call our Nurse Line with questions: [phone]

## 2025-08-12 ENCOUNTER — APPOINTMENT (OUTPATIENT)
Dept: GASTROENTEROLOGY | Facility: HOSPITAL | Age: 25
End: 2025-08-12
Payer: COMMERCIAL

## 2025-09-29 ENCOUNTER — APPOINTMENT (OUTPATIENT)
Dept: DERMATOLOGY | Facility: CLINIC | Age: 25
End: 2025-09-29
Payer: COMMERCIAL